# Patient Record
Sex: MALE | Race: WHITE | Employment: FULL TIME | URBAN - NONMETROPOLITAN AREA
[De-identification: names, ages, dates, MRNs, and addresses within clinical notes are randomized per-mention and may not be internally consistent; named-entity substitution may affect disease eponyms.]

---

## 2020-09-02 RX ORDER — ERGOCALCIFEROL 1.25 MG/1
CAPSULE ORAL
Qty: 4 CAP | Refills: 0 | Status: SHIPPED | OUTPATIENT
Start: 2020-09-02 | End: 2020-09-25

## 2020-09-27 RX ORDER — ERGOCALCIFEROL 1.25 MG/1
CAPSULE ORAL
Qty: 12 CAP | Refills: 1 | Status: SHIPPED | OUTPATIENT
Start: 2020-09-27 | End: 2021-05-03

## 2021-03-01 ENCOUNTER — HOSPITAL ENCOUNTER (OUTPATIENT)
Dept: LAB | Age: 41
Discharge: HOME | End: 2021-03-01

## 2021-05-03 RX ORDER — ERGOCALCIFEROL 1.25 MG/1
CAPSULE ORAL
Qty: 12 CAP | Refills: 1 | Status: SHIPPED | OUTPATIENT
Start: 2021-05-03 | End: 2021-08-27 | Stop reason: SDUPTHER

## 2021-08-13 ENCOUNTER — TELEPHONE (OUTPATIENT)
Dept: FAMILY MEDICINE CLINIC | Age: 41
End: 2021-08-13

## 2021-08-13 DIAGNOSIS — Z91.89 ENCOUNTER FOR HEPATITIS C VIRUS SCREENING TEST FOR HIGH RISK PATIENT: ICD-10-CM

## 2021-08-13 DIAGNOSIS — Z13.29 THYROID DISORDER SCREEN: ICD-10-CM

## 2021-08-13 DIAGNOSIS — E55.9 VITAMIN D DEFICIENCY: ICD-10-CM

## 2021-08-13 DIAGNOSIS — E53.8 COBALAMIN DEFICIENCY: Primary | ICD-10-CM

## 2021-08-13 DIAGNOSIS — E29.1 HYPOGONADISM IN MALE: ICD-10-CM

## 2021-08-13 DIAGNOSIS — Z11.59 ENCOUNTER FOR HEPATITIS C VIRUS SCREENING TEST FOR HIGH RISK PATIENT: ICD-10-CM

## 2021-08-13 DIAGNOSIS — Z00.00 ENCOUNTER FOR ROUTINE ADULT HEALTH EXAMINATION WITHOUT ABNORMAL FINDINGS: ICD-10-CM

## 2021-08-13 NOTE — TELEPHONE ENCOUNTER
Done. He needs to make sure he gets those labs done first thing in the morning at the hospital preferably before 9am because that's when the testosterone level with be most accurate.

## 2021-08-13 NOTE — TELEPHONE ENCOUNTER
When you get a chance, could you order labs for pt to get done at the hospital?    I called to get his insurance info and he brought up wanting to get labs done before his appt on 8/27.

## 2021-08-19 ENCOUNTER — HOSPITAL ENCOUNTER (OUTPATIENT)
Dept: LAB | Age: 41
Discharge: HOME | End: 2021-08-19

## 2021-08-19 PROCEDURE — 99001 SPECIMEN HANDLING PT-LAB: CPT

## 2021-08-27 ENCOUNTER — OFFICE VISIT (OUTPATIENT)
Dept: FAMILY MEDICINE CLINIC | Age: 41
End: 2021-08-27
Payer: COMMERCIAL

## 2021-08-27 VITALS
SYSTOLIC BLOOD PRESSURE: 114 MMHG | BODY MASS INDEX: 36.05 KG/M2 | HEIGHT: 70 IN | OXYGEN SATURATION: 96 % | WEIGHT: 251.8 LBS | DIASTOLIC BLOOD PRESSURE: 68 MMHG | HEART RATE: 71 BPM

## 2021-08-27 DIAGNOSIS — Z02.89 ENCOUNTER FOR FIREFIGHTER HEALTH EXAMINATION: Primary | ICD-10-CM

## 2021-08-27 DIAGNOSIS — E66.9 OBESITY (BMI 35.0-39.9 WITHOUT COMORBIDITY): ICD-10-CM

## 2021-08-27 DIAGNOSIS — Z00.00 WELLNESS EXAMINATION: ICD-10-CM

## 2021-08-27 PROCEDURE — 99214 OFFICE O/P EST MOD 30 MIN: CPT | Performed by: NURSE PRACTITIONER

## 2021-08-27 PROCEDURE — 99396 PREV VISIT EST AGE 40-64: CPT | Performed by: NURSE PRACTITIONER

## 2021-08-27 RX ORDER — PHENTERMINE HYDROCHLORIDE 37.5 MG/1
37.5 TABLET ORAL
Qty: 30 TABLET | Refills: 2 | Status: SHIPPED | OUTPATIENT
Start: 2021-08-27 | End: 2021-11-22

## 2021-08-27 RX ORDER — ERGOCALCIFEROL 1.25 MG/1
CAPSULE ORAL
Qty: 12 CAPSULE | Refills: 1 | Status: SHIPPED | OUTPATIENT
Start: 2021-08-27 | End: 2022-01-25

## 2021-08-27 NOTE — PROGRESS NOTES
Lester Bravo is a 36 y.o.male presents with   Chief Complaint   Patient presents with    Physical       80-year-old male presents today in office for annual adult preventive exam.  He has medical history significant for hypogonadism currently followed by urology receives testosterone replacement therapy via injections. He is a  and has presented today with documentation for the \" cancer awareness and prevention program \"from Dr. Charlie Herrmann and request to had the annual labs and screening test conducted today as well. Patient complains of increasing weight gain despite working to improve dietary and lifestyle modifications. He denies ever trying any types of medical management for this. Subjective:           Past Medical History:   Diagnosis Date    Genital herpes simplex     HLD (hyperlipidemia)     Otitis externa     Vitamin D deficiency      Past Surgical History:   Procedure Laterality Date    HX APPENDECTOMY  09/05/2014     Social History     Socioeconomic History    Marital status:      Spouse name: Not on file    Number of children: Not on file    Years of education: Not on file    Highest education level: Not on file   Tobacco Use    Smoking status: Never Smoker    Smokeless tobacco: Never Used   Substance and Sexual Activity    Alcohol use: Not Currently    Drug use: Never     Social Determinants of Health     Financial Resource Strain:     Difficulty of Paying Living Expenses:    Food Insecurity:     Worried About Running Out of Food in the Last Year:     Ran Out of Food in the Last Year:    Transportation Needs:     Lack of Transportation (Medical):      Lack of Transportation (Non-Medical):    Physical Activity:     Days of Exercise per Week:     Minutes of Exercise per Session:    Stress:     Feeling of Stress :    Social Connections:     Frequency of Communication with Friends and Family:     Frequency of Social Gatherings with Friends and Family:     Attends Alevism Services:     Active Member of Clubs or Organizations:     Attends Club or Organization Meetings:     Marital Status:      Current Outpatient Medications   Medication Sig Dispense Refill    phentermine (ADIPEX-P) 37.5 mg tablet Take 1 Tablet by mouth every morning. Max Daily Amount: 37.5 mg. 30 Tablet 2    ergocalciferol (ERGOCALCIFEROL) 1,250 mcg (50,000 unit) capsule TAKE ONE (1) CAPSULE BY MOUTH ONCE A WEEK 12 Capsule 1    testosterone cypionate (DEPOTESTOTERONE CYPIONATE) 200 mg/mL injection Inject 0.5ml SQ weekly 6 mL 1    Needle, Disp, 18 G 18 gauge x 1\" ndle Use this needle to draw up 0.5ml Testosterone Cypionate 30 Pen Needle 1    Needle, Disp, 25 G 25 gauge x 5/8\" ndle Use this needle to inject 0.5ml SQ weekly 30 Each 1    Syringe, Disposable, 1 mL syrg Use as directed 30 Syringe 1    Syringe, Disposable, 1 mL syrg Use as directed 30 Syringe 1     No Known Allergies  The patient has a family history of    REVIEW OF SYSTEMS  Review of Systems   Constitutional: Negative for chills and fever. HENT: Negative for ear discharge, ear pain, hearing loss, sinus pain and sore throat. Eyes: Negative for pain. Respiratory: Negative for cough and shortness of breath. Cardiovascular: Negative for chest pain, palpitations and leg swelling. Gastrointestinal: Negative for abdominal pain, nausea and vomiting. Genitourinary: Negative for dysuria, frequency and urgency. Musculoskeletal: Negative for falls, myalgias and neck pain. Skin: Negative for rash. Neurological: Negative for dizziness, tingling, tremors and headaches. Psychiatric/Behavioral: Negative for depression, substance abuse and suicidal ideas. The patient is not nervous/anxious and does not have insomnia.         Objective:     Visit Vitals  /68 (BP 1 Location: Left upper arm, BP Patient Position: Sitting, BP Cuff Size: Adult)   Pulse 71   Ht 5' 10\" (1.778 m)   Wt 251 lb 12.8 oz (114.2 kg)   SpO2 96%   BMI 36.13 kg/m²       Current Outpatient Medications   Medication Instructions    ergocalciferol (ERGOCALCIFEROL) 1,250 mcg (50,000 unit) capsule TAKE ONE (1) CAPSULE BY MOUTH ONCE A WEEK     Needle, Disp, 18 G 18 gauge x 1\" ndle Use this needle to draw up 0.5ml Testosterone Cypionate    Needle, Disp, 25 G 25 gauge x 5/8\" ndle Use this needle to inject 0.5ml SQ weekly    phentermine (ADIPEX-P) 37.5 mg, Oral, 7AM    Syringe, Disposable, 1 mL syrg Use as directed    Syringe, Disposable, 1 mL syrg Use as directed    testosterone cypionate (DEPOTESTOTERONE CYPIONATE) 200 mg/mL injection Inject 0.5ml SQ weekly        PHYSICAL EXAM  Physical Exam  Constitutional:       Appearance: He is obese. HENT:      Head: Normocephalic and atraumatic. Right Ear: External ear normal.      Left Ear: External ear normal.   Eyes:      General: No scleral icterus. Conjunctiva/sclera: Conjunctivae normal.   Cardiovascular:      Rate and Rhythm: Normal rate and regular rhythm. Pulses: Normal pulses. Heart sounds: Normal heart sounds. No murmur heard. Pulmonary:      Effort: Pulmonary effort is normal. No respiratory distress. Breath sounds: Normal breath sounds. Musculoskeletal:         General: No swelling or tenderness. Cervical back: Normal range of motion. Lymphadenopathy:      Cervical: No cervical adenopathy. Skin:     General: Skin is warm and dry. Findings: No erythema. Neurological:      Mental Status: He is alert and oriented to person, place, and time. Psychiatric:         Mood and Affect: Mood normal.         Behavior: Behavior normal.         Thought Content: Thought content normal.         Judgment: Judgment normal.         Assessment/Plan:     Diagnoses and all orders for this visit:    1.  Encounter for  health examination  -     HEPATITIS PANEL, ACUTE  -     PSA SCREENING (SCREENING)  -     REFERRAL TO GASTROENTEROLOGY  -     EXERCISE CARDIAC STRESS TEST; Future  -     EKG, 12 LEAD, INITIAL; Future  -     ECHO ADULT COMPLETE; Future  -     THYROID STIMULATING IMMUNOGLOBULIN  -     URINALYSIS W/MICROSCOPIC  -     THYROID PEROXIDASE (TPO) AB  -     T3, FREE  -     T4, FREE  I have placed the orders as recommended per the comprehensive  cancer awareness and prevention program     2. Obesity (BMI 35.0-39.9 without comorbidity)  -     phentermine (ADIPEX-P) 37.5 mg tablet; Take 1 Tablet by mouth every morning. Max Daily Amount: 37.5 mg. Patient's BMI and negative contraindications I am starting him on phentermine I did relay the possible side effects from this medication instructed him to be certain he stays well-hydrated with water while taking the medication he is to continue to work to improve dietary lifestyle modifications and will follow up with me in 3 months or sooner as needed. 3. Wellness examination        Follow-up and Dispositions    · Return in about 3 months (around 11/27/2021). Disclaimer:    I have discussed the diagnosis with the patient and the intended plan as seen above. The patient understands our medical plan. The risks, benefits and significant side effects of all medications have been reviewed. Anticipated time course and progression of condition reviewed. All questions have been addressed. He received an after visit summary, with information reviewed, and questions answered. Where appropriate, he is instructed to call the clinic if he has not been notified either by phone or through [address] with the results of his tests or with an appointment plan for any referrals within 1 week(s). The patient  is to call if his condition worsens or fails to improve or if significant side effects are experienced.        Claudia Richardson NP

## 2021-08-27 NOTE — PROGRESS NOTES
Regulo Beckham presents today for   Chief Complaint   Patient presents with    Physical       Is someone accompanying this pt? no    Is the patient using any DME equipment during OV? no    Depression Screening:  3 most recent PHQ Screens 8/27/2021   Little interest or pleasure in doing things Not at all   Feeling down, depressed, irritable, or hopeless Not at all   Total Score PHQ 2 0       Learning Assessment:  Learning Assessment 8/27/2021   PRIMARY LEARNER Patient   HIGHEST LEVEL OF EDUCATION - PRIMARY LEARNER  SOME COLLEGE   BARRIERS PRIMARY LEARNER NONE   CO-LEARNER CAREGIVER No   PRIMARY LANGUAGE ENGLISH   LEARNER PREFERENCE PRIMARY LISTENING   ANSWERED BY pateint   RELATIONSHIP SELF         Health Maintenance reviewed and discussed and ordered per Provider. Health Maintenance Due   Topic Date Due    Hepatitis C Screening  Never done    COVID-19 Vaccine (1) Never done    DTaP/Tdap/Td series (1 - Tdap) Never done    Lipid Screen  Never done   . Coordination of Care:  1. Have you been to the ER, urgent care clinic since your last visit? Hospitalized since your last visit? no    2. Have you seen or consulted any other health care providers outside of the [address] [place] [address] since your last visit? Include any pap smears or colon screening.  no

## 2021-08-28 LAB
25(OH)D3+25(OH)D2 SERPL-MCNC: 40 NG/ML (ref 30–100)
ALBUMIN SERPL-MCNC: 4.7 G/DL (ref 4–5)
ALBUMIN/GLOB SERPL: 1.8 {RATIO} (ref 1.2–2.2)
ALP SERPL-CCNC: 58 IU/L (ref 48–121)
ALT SERPL-CCNC: 25 IU/L (ref 0–44)
AST SERPL-CCNC: 18 IU/L (ref 0–40)
BILIRUB SERPL-MCNC: 0.5 MG/DL (ref 0–1.2)
BUN SERPL-MCNC: 15 MG/DL (ref 6–24)
BUN/CREAT SERPL: 14 (ref 9–20)
CALCIUM SERPL-MCNC: 9.4 MG/DL (ref 8.7–10.2)
CHLORIDE SERPL-SCNC: 103 MMOL/L (ref 96–106)
CHOLEST SERPL-MCNC: 221 MG/DL (ref 100–199)
CO2 SERPL-SCNC: 20 MMOL/L (ref 20–29)
CREAT SERPL-MCNC: 1.09 MG/DL (ref 0.76–1.27)
ERYTHROCYTE [DISTWIDTH] IN BLOOD BY AUTOMATED COUNT: 13.4 % (ref 11.6–15.4)
GLOBULIN SER CALC-MCNC: 2.6 G/DL (ref 1.5–4.5)
GLUCOSE SERPL-MCNC: 95 MG/DL (ref 65–99)
HCT VFR BLD AUTO: 47.5 % (ref 37.5–51)
HCV AB S/CO SERPL IA: <0.1 S/CO RATIO (ref 0–0.9)
HDLC SERPL-MCNC: 27 MG/DL
HGB BLD-MCNC: 16.2 G/DL (ref 13–17.7)
LDLC SERPL CALC-MCNC: 132 MG/DL (ref 0–99)
MCH RBC QN AUTO: 29.9 PG (ref 26.6–33)
MCHC RBC AUTO-ENTMCNC: 34.1 G/DL (ref 31.5–35.7)
MCV RBC AUTO: 88 FL (ref 79–97)
PLATELET # BLD AUTO: 288 X10E3/UL (ref 150–450)
POTASSIUM SERPL-SCNC: 4.8 MMOL/L (ref 3.5–5.2)
PROT SERPL-MCNC: 7.3 G/DL (ref 6–8.5)
RBC # BLD AUTO: 5.42 X10E6/UL (ref 4.14–5.8)
SODIUM SERPL-SCNC: 141 MMOL/L (ref 134–144)
SPECIMEN STATUS REPORT, ROLRST: NORMAL
TESTOST FREE SERPL-MCNC: 20.5 PG/ML (ref 6.8–21.5)
TESTOST SERPL-MCNC: 617.3 NG/DL (ref 264–916)
TRIGL SERPL-MCNC: 341 MG/DL (ref 0–149)
TSH SERPL DL<=0.005 MIU/L-ACNC: 1.63 UIU/ML (ref 0.45–4.5)
VIT B12 SERPL-MCNC: 375 PG/ML (ref 232–1245)
VLDLC SERPL CALC-MCNC: 62 MG/DL (ref 5–40)
WBC # BLD AUTO: 7.6 X10E3/UL (ref 3.4–10.8)

## 2021-11-22 DIAGNOSIS — E66.9 OBESITY (BMI 35.0-39.9 WITHOUT COMORBIDITY): ICD-10-CM

## 2021-11-22 RX ORDER — PHENTERMINE HYDROCHLORIDE 37.5 MG/1
TABLET ORAL
Qty: 30 TABLET | Refills: 2 | Status: SHIPPED | OUTPATIENT
Start: 2021-11-22 | End: 2021-12-13 | Stop reason: SDUPTHER

## 2021-12-13 ENCOUNTER — OFFICE VISIT (OUTPATIENT)
Dept: FAMILY MEDICINE CLINIC | Age: 41
End: 2021-12-13
Payer: COMMERCIAL

## 2021-12-13 VITALS
OXYGEN SATURATION: 99 % | RESPIRATION RATE: 18 BRPM | HEART RATE: 73 BPM | SYSTOLIC BLOOD PRESSURE: 122 MMHG | HEIGHT: 70 IN | WEIGHT: 233.4 LBS | BODY MASS INDEX: 33.41 KG/M2 | DIASTOLIC BLOOD PRESSURE: 86 MMHG

## 2021-12-13 DIAGNOSIS — E66.9 OBESITY (BMI 35.0-39.9 WITHOUT COMORBIDITY): ICD-10-CM

## 2021-12-13 PROCEDURE — 99213 OFFICE O/P EST LOW 20 MIN: CPT | Performed by: NURSE PRACTITIONER

## 2021-12-13 RX ORDER — PHENTERMINE HYDROCHLORIDE 37.5 MG/1
TABLET ORAL
Qty: 30 TABLET | Refills: 2 | Status: SHIPPED | OUTPATIENT
Start: 2021-12-13 | End: 2022-06-27 | Stop reason: SDUPTHER

## 2021-12-13 NOTE — PROGRESS NOTES
Nadia Pereyra presents today for   Chief Complaint   Patient presents with    Follow-up       Is someone accompanying this pt? No    Is the patient using any DME equipment during OV? No    Depression Screening:  3 most recent PHQ Screens 12/13/2021   Little interest or pleasure in doing things Not at all   Feeling down, depressed, irritable, or hopeless Not at all   Total Score PHQ 2 0       Learning Assessment:  Learning Assessment 8/27/2021   PRIMARY LEARNER Patient   HIGHEST LEVEL OF EDUCATION - PRIMARY LEARNER  SOME COLLEGE   BARRIERS PRIMARY LEARNER NONE   CO-LEARNER CAREGIVER No   PRIMARY LANGUAGE ENGLISH   LEARNER PREFERENCE PRIMARY LISTENING   ANSWERED BY pateint   RELATIONSHIP SELF       Health Maintenance reviewed and discussed and ordered per Provider. Health Maintenance Due   Topic Date Due    DTaP/Tdap/Td series (1 - Tdap) Never done    Flu Vaccine (1) Never done   . Coordination of Care:  1. Have you been to the ER, urgent care clinic since your last visit? Hospitalized since your last visit? No    2. Have you seen or consulted any other health care providers outside of the [address] [place] [address] since your last visit? Include any pap smears or colon screening.  No

## 2021-12-14 NOTE — PROGRESS NOTES
Artemio Lock is a 39 y.o.male presents with   Chief Complaint   Patient presents with    Follow-up     40-year-old male presents today office for 3-month follow-up related to starting phentermine for weight loss. August 2021 patient was 251 pounds he is now down to 233 pounds and states he feels really well. He states it is now deer season and with hunting he is doing a lot of walking and physical activity. He states he continues to work to improve dietary and lifestyle modifications. Subjective:           Past Medical History:   Diagnosis Date    Genital herpes simplex     HLD (hyperlipidemia)     Otitis externa     Vitamin D deficiency      Past Surgical History:   Procedure Laterality Date    HX APPENDECTOMY  09/05/2014     Social History     Socioeconomic History    Marital status:    Tobacco Use    Smoking status: Never Smoker    Smokeless tobacco: Never Used   Substance and Sexual Activity    Alcohol use: Not Currently    Drug use: Never     Current Outpatient Medications   Medication Sig Dispense Refill    phentermine (ADIPEX-P) 37.5 mg tablet TAKE 1 TABLET BY MOUTH EVERY MORNING *MAX DAILY AMOUNT 37. 5MG* 30 Tablet 2    testosterone cypionate (DEPOTESTOTERONE CYPIONATE) 200 mg/mL injection INJECT INTRAMUSCULARLY 0.5 ML WEEKLY 6 mL 1    ergocalciferol (ERGOCALCIFEROL) 1,250 mcg (50,000 unit) capsule TAKE ONE (1) CAPSULE BY MOUTH ONCE A WEEK 12 Capsule 1    Needle, Disp, 18 G 18 gauge x 1\" ndle Use this needle to draw up 0.5ml Testosterone Cypionate 30 Pen Needle 1    Needle, Disp, 25 G 25 gauge x 5/8\" ndle Use this needle to inject 0.5ml SQ weekly 30 Each 1    Syringe, Disposable, 1 mL syrg Use as directed 30 Syringe 1    Syringe, Disposable, 1 mL syrg Use as directed 30 Syringe 1     No Known Allergies  The patient has a family history of    REVIEW OF SYSTEMS  Review of Systems   Cardiovascular: Negative for chest pain and palpitations.        Objective:     Visit Vitals  /86 (BP 1 Location: Right upper arm, BP Patient Position: Sitting, BP Cuff Size: Large adult)   Pulse 73   Resp 18   Ht 5' 10\" (1.778 m)   Wt 233 lb 6.4 oz (105.9 kg)   SpO2 99%   BMI 33.49 kg/m²       Current Outpatient Medications   Medication Instructions    ergocalciferol (ERGOCALCIFEROL) 1,250 mcg (50,000 unit) capsule TAKE ONE (1) CAPSULE BY MOUTH ONCE A WEEK     Needle, Disp, 18 G 18 gauge x 1\" ndle Use this needle to draw up 0.5ml Testosterone Cypionate    Needle, Disp, 25 G 25 gauge x 5/8\" ndle Use this needle to inject 0.5ml SQ weekly    phentermine (ADIPEX-P) 37.5 mg tablet TAKE 1 TABLET BY MOUTH EVERY MORNING *MAX DAILY AMOUNT 37. 5MG*    Syringe, Disposable, 1 mL syrg Use as directed    Syringe, Disposable, 1 mL syrg Use as directed    testosterone cypionate (DEPOTESTOTERONE CYPIONATE) 200 mg/mL injection INJECT INTRAMUSCULARLY 0.5 ML WEEKLY        PHYSICAL EXAM  Physical Exam  Constitutional:       Appearance: Normal appearance. Cardiovascular:      Heart sounds: Normal heart sounds. Pulmonary:      Breath sounds: Normal breath sounds. Skin:     General: Skin is warm and dry. Neurological:      Mental Status: He is alert and oriented to person, place, and time. Psychiatric:         Behavior: Behavior normal.         Assessment/Plan:     Diagnoses and all orders for this visit:    1. Obesity (BMI 35.0-39.9 without comorbidity)  -     phentermine (ADIPEX-P) 37.5 mg tablet; TAKE 1 TABLET BY MOUTH EVERY MORNING *MAX DAILY AMOUNT 37. 5MG*      constipation    Follow-up and Dispositions    · Return in about 6 months (around 6/13/2022). Disclaimer:    I have discussed the diagnosis with the patient and the intended plan as seen above. The patient understands our medical plan. The risks, benefits and significant side effects of all medications have been reviewed. Anticipated time course and progression of condition reviewed. All questions have been addressed.   He received an after visit summary, with information reviewed, and questions answered. Where appropriate, he is instructed to call the clinic if he has not been notified either by phone or through [address] with the results of his tests or with an appointment plan for any referrals within 1 week(s). The patient  is to call if his condition worsens or fails to improve or if significant side effects are experienced.        Luis Downing NP

## 2022-01-25 RX ORDER — ERGOCALCIFEROL 1.25 MG/1
CAPSULE ORAL
Qty: 12 CAPSULE | Refills: 1 | Status: SHIPPED | OUTPATIENT
Start: 2022-01-25 | End: 2022-09-26

## 2022-02-25 ENCOUNTER — HOSPITAL ENCOUNTER (OUTPATIENT)
Dept: LAB | Age: 42
Discharge: HOME | End: 2022-02-25

## 2022-02-25 PROCEDURE — 99001 SPECIMEN HANDLING PT-LAB: CPT

## 2022-05-27 ENCOUNTER — HOSPITAL ENCOUNTER (OUTPATIENT)
Dept: LAB | Age: 42
Discharge: HOME | End: 2022-05-27

## 2022-05-27 PROCEDURE — 99001 SPECIMEN HANDLING PT-LAB: CPT

## 2022-06-27 ENCOUNTER — OFFICE VISIT (OUTPATIENT)
Dept: FAMILY MEDICINE CLINIC | Age: 42
End: 2022-06-27
Payer: COMMERCIAL

## 2022-06-27 VITALS
HEART RATE: 74 BPM | BODY MASS INDEX: 35.59 KG/M2 | DIASTOLIC BLOOD PRESSURE: 86 MMHG | SYSTOLIC BLOOD PRESSURE: 130 MMHG | RESPIRATION RATE: 19 BRPM | HEIGHT: 70 IN | OXYGEN SATURATION: 97 % | TEMPERATURE: 97.9 F | WEIGHT: 248.6 LBS

## 2022-06-27 DIAGNOSIS — R79.89 LOW TESTOSTERONE: ICD-10-CM

## 2022-06-27 DIAGNOSIS — E66.09 CLASS 2 OBESITY DUE TO EXCESS CALORIES WITHOUT SERIOUS COMORBIDITY WITH BODY MASS INDEX (BMI) OF 35.0 TO 35.9 IN ADULT: ICD-10-CM

## 2022-06-27 DIAGNOSIS — Z00.00 WELLNESS EXAMINATION: Primary | ICD-10-CM

## 2022-06-27 DIAGNOSIS — E66.9 OBESITY (BMI 35.0-39.9 WITHOUT COMORBIDITY): ICD-10-CM

## 2022-06-27 PROCEDURE — 99213 OFFICE O/P EST LOW 20 MIN: CPT | Performed by: NURSE PRACTITIONER

## 2022-06-27 RX ORDER — PHENTERMINE HYDROCHLORIDE 37.5 MG/1
TABLET ORAL
Qty: 30 TABLET | Refills: 2 | Status: SHIPPED | OUTPATIENT
Start: 2022-06-27

## 2022-06-27 NOTE — PROGRESS NOTES
Tom Bonilla is a 39 y.o.male presents with   Chief Complaint   Patient presents with    Follow-up       {[id number]}        Subjective:           Past Medical History:   Diagnosis Date    Genital herpes simplex     HLD (hyperlipidemia)     Otitis externa     Vitamin D deficiency      Past Surgical History:   Procedure Laterality Date    HX APPENDECTOMY  09/05/2014     Social History     Socioeconomic History    Marital status:    Tobacco Use    Smoking status: Never Smoker    Smokeless tobacco: Never Used   Substance and Sexual Activity    Alcohol use: Not Currently     Alcohol/week: 0.0 standard drinks    Drug use: Never     Current Outpatient Medications   Medication Sig Dispense Refill    testosterone cypionate (DEPOTESTOTERONE CYPIONATE) 200 mg/mL injection INJECT 0.5ML  INTRAMUSCULARLY ONCE A WEEK 6 mL 1    tadalafiL (CIALIS) 5 mg tablet Take 1 Tablet by mouth daily. 90 Tablet 3    ergocalciferol (ERGOCALCIFEROL) 1,250 mcg (50,000 unit) capsule TAKE ONE (1) CAPSULE BY MOUTH ONCE A WEEK 12 Capsule 1    Needle, Disp, 18 G 18 gauge x 1\" ndle Use this needle to draw up 0.5ml Testosterone Cypionate 30 Pen Needle 1    Needle, Disp, 25 G 25 gauge x 5/8\" ndle Use this needle to inject 0.5ml SQ weekly 30 Each 1    Syringe, Disposable, 1 mL syrg Use as directed 30 Syringe 1    phentermine (ADIPEX-P) 37.5 mg tablet TAKE 1 TABLET BY MOUTH EVERY MORNING *MAX DAILY AMOUNT 37. 5MG* (Patient not taking: Reported on 6/27/2022) 30 Tablet 2     No Known Allergies  The patient has a family history of    REVIEW OF SYSTEMS  ROS    Objective:     Visit Vitals  /86 (BP 1 Location: Right upper arm, BP Patient Position: Sitting, BP Cuff Size: Large adult)   Pulse 74   Temp 97.9 °F (36.6 °C) (Temporal)   Resp 19   Ht 5' 10\" (1.778 m)   Wt 248 lb 9.6 oz (112.8 kg)   SpO2 97%   BMI 35.67 kg/m²       Current Outpatient Medications   Medication Instructions    ergocalciferol (ERGOCALCIFEROL) 1,250 mcg (50,000 unit) capsule TAKE ONE (1) CAPSULE BY MOUTH ONCE A WEEK    Needle, Disp, 18 G 18 gauge x 1\" ndle Use this needle to draw up 0.5ml Testosterone Cypionate    Needle, Disp, 25 G 25 gauge x 5/8\" ndle Use this needle to inject 0.5ml SQ weekly    phentermine (ADIPEX-P) 37.5 mg tablet TAKE 1 TABLET BY MOUTH EVERY MORNING *MAX DAILY AMOUNT 37. 5MG*    Syringe, Disposable, 1 mL syrg Use as directed    tadalafiL (CIALIS) 5 mg, Oral, DAILY    testosterone cypionate (DEPOTESTOTERONE CYPIONATE) 200 mg/mL injection INJECT 0.5ML  INTRAMUSCULARLY ONCE A WEEK        PHYSICAL EXAM  Physical Exam    Assessment/Plan:     Diagnoses and all orders for this visit:    1. Wellness examination  -     CBC W/O DIFF; Future  -     METABOLIC PANEL, COMPREHENSIVE; Future  -     LIPID PANEL; Future  -     VITAMIN D, 25 HYDROXY; Future  -     VITAMIN B12; Future  -     TSH 3RD GENERATION; Future  -     HEMOGLOBIN A1C W/O EAG; Future    2. Class 2 obesity due to excess calories without serious comorbidity with body mass index (BMI) of 35.0 to 35.9 in adult    3. Low testosterone        Follow-up and Dispositions    · Return in about 3 months (around 9/27/2022) for weight loss, wellness. Disclaimer:    I have discussed the diagnosis with the patient and the intended plan as seen above. The patient understands our medical plan. The risks, benefits and significant side effects of all medications have been reviewed. Anticipated time course and progression of condition reviewed. All questions have been addressed. He received an after visit summary, with information reviewed, and questions answered. Where appropriate, he is instructed to call the clinic if he has not been notified either by phone or through [address] with the results of his tests or with an appointment plan for any referrals within 1 week(s). The patient  is to call if his condition worsens or fails to improve or if significant side effects are experienced. Sanjuan Lombard, NP

## 2022-06-27 NOTE — PROGRESS NOTES
Vic De La Rosa presents today for No chief complaint on file. Is someone accompanying this pt? No    Is the patient using any DME equipment during OV? No    Depression Screening:  3 most recent PHQ Screens 6/27/2022   Little interest or pleasure in doing things Not at all   Feeling down, depressed, irritable, or hopeless Not at all   Total Score PHQ 2 0       Learning Assessment:  Learning Assessment 8/27/2021   PRIMARY LEARNER Patient   HIGHEST LEVEL OF EDUCATION - PRIMARY LEARNER  SOME COLLEGE   BARRIERS PRIMARY LEARNER NONE   CO-LEARNER CAREGIVER No   PRIMARY LANGUAGE ENGLISH   LEARNER PREFERENCE PRIMARY LISTENING   ANSWERED BY pateint   RELATIONSHIP SELF       Fall Risk  No flowsheet data found. Health Maintenance reviewed and discussed and ordered per Provider. Health Maintenance Due   Topic Date Due    DTaP/Tdap/Td series (1 - Tdap) Never done    COVID-19 Vaccine (3 - Booster for Moderna series) 01/31/2022   . Coordination of Care:    1. Have you been to the ER, urgent care clinic since your last visit? Hospitalized since your last visit? NO    2. Have you seen or consulted any other health care providers outside of the [address] [place] [address] since your last visit? Include any pap smears or colon screening. No    3. For patients aged 39-70: Has the patient had a colonoscopy / FIT/ Cologuard? NA - based on age      If the patient is female:    4. For patients aged 41-77: Has the patient had a mammogram within the past 2 years? NA - based on age or sex      11. For patients aged 21-65: Has the patient had a pap smear?  NA - based on age or sex
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
## 2022-08-22 DIAGNOSIS — Z00.00 WELLNESS EXAMINATION: ICD-10-CM

## 2022-09-26 ENCOUNTER — HOSPITAL ENCOUNTER (OUTPATIENT)
Dept: LAB | Age: 42
Discharge: HOME | End: 2022-09-26

## 2022-09-26 PROCEDURE — 99001 SPECIMEN HANDLING PT-LAB: CPT

## 2022-09-26 RX ORDER — ERGOCALCIFEROL 1.25 MG/1
CAPSULE ORAL
Qty: 12 CAPSULE | Refills: 1 | Status: SHIPPED | OUTPATIENT
Start: 2022-09-26

## 2022-09-28 LAB — HBA1C MFR BLD HPLC: 5.7 %

## 2022-09-30 ENCOUNTER — OFFICE VISIT (OUTPATIENT)
Dept: FAMILY MEDICINE CLINIC | Age: 42
End: 2022-09-30
Payer: COMMERCIAL

## 2022-09-30 VITALS
DIASTOLIC BLOOD PRESSURE: 86 MMHG | BODY MASS INDEX: 35.59 KG/M2 | TEMPERATURE: 97.1 F | SYSTOLIC BLOOD PRESSURE: 118 MMHG | OXYGEN SATURATION: 98 % | HEART RATE: 66 BPM | HEIGHT: 70 IN | WEIGHT: 248.6 LBS | RESPIRATION RATE: 19 BRPM

## 2022-09-30 DIAGNOSIS — E66.09 CLASS 2 OBESITY DUE TO EXCESS CALORIES WITHOUT SERIOUS COMORBIDITY WITH BODY MASS INDEX (BMI) OF 35.0 TO 35.9 IN ADULT: Primary | ICD-10-CM

## 2022-09-30 DIAGNOSIS — R79.89 LOW TESTOSTERONE: ICD-10-CM

## 2022-09-30 DIAGNOSIS — E78.2 MIXED HYPERLIPIDEMIA: ICD-10-CM

## 2022-09-30 DIAGNOSIS — R73.03 PRE-DIABETES: ICD-10-CM

## 2022-09-30 PROCEDURE — 99396 PREV VISIT EST AGE 40-64: CPT | Performed by: NURSE PRACTITIONER

## 2022-09-30 RX ORDER — METFORMIN HYDROCHLORIDE 500 MG/1
TABLET, EXTENDED RELEASE ORAL
Qty: 360 TABLET | Refills: 1 | Status: SHIPPED | OUTPATIENT
Start: 2022-09-30

## 2022-09-30 NOTE — PROGRESS NOTES
Clari Tarango presents today for   Chief Complaint   Patient presents with    Physical       Is someone accompanying this pt? No    Is the patient using any DME equipment during OV? No    Depression Screening:  3 most recent PHQ Screens 9/30/2022   Little interest or pleasure in doing things Not at all   Feeling down, depressed, irritable, or hopeless Not at all   Total Score PHQ 2 0       Learning Assessment:  Learning Assessment 8/27/2021   PRIMARY LEARNER Patient   HIGHEST LEVEL OF EDUCATION - PRIMARY LEARNER  SOME COLLEGE   BARRIERS PRIMARY LEARNER NONE   CO-LEARNER CAREGIVER No   PRIMARY LANGUAGE ENGLISH   LEARNER PREFERENCE PRIMARY LISTENING   ANSWERED BY pateint   RELATIONSHIP SELF       Fall Risk  No flowsheet data found. Health Maintenance reviewed and discussed and ordered per Provider. Health Maintenance Due   Topic Date Due    DTaP/Tdap/Td series (1 - Tdap) Never done    COVID-19 Vaccine (3 - Booster for Moderna series) 01/31/2022    Flu Vaccine (1) Never done   . Coordination of Care:    1. Have you been to the ER, urgent care clinic since your last visit? Hospitalized since your last visit? No    2. Have you seen or consulted any other health care providers outside of the [address] [place] [address] since your last visit? Include any pap smears or colon screening. no    3. For patients aged 39-70: Has the patient had a colonoscopy / FIT/ Cologuard? NA - based on age      If the patient is female:    4. For patients aged 41-77: Has the patient had a mammogram within the past 2 years? NA - based on age or sex      11. For patients aged 21-65: Has the patient had a pap smear?  NA - based on age or sex

## 2022-09-30 NOTE — PROGRESS NOTES
Mark Carter is a 39 y.o.male presents with   Chief Complaint   Patient presents with    Physical     51-year-old male presents today in office for annual adult preventive exam.  He had all lab work conducted prior to today's visit. A1c showed 5.9% prediabetes along with mixed hyperlipidemia with triglycerides nonfasting in the 400 range. Patient was previously on phentermine states he stopped the medication due to dizziness and lightheadedness. Subjective:           Past Medical History:   Diagnosis Date    Genital herpes simplex     HLD (hyperlipidemia)     Otitis externa     Vitamin D deficiency      Past Surgical History:   Procedure Laterality Date    HX APPENDECTOMY  09/05/2014     Social History     Socioeconomic History    Marital status:    Tobacco Use    Smoking status: Never    Smokeless tobacco: Never   Substance and Sexual Activity    Alcohol use: Not Currently     Alcohol/week: 0.0 standard drinks    Drug use: Never     Current Outpatient Medications   Medication Sig Dispense Refill    metFORMIN ER (GLUCOPHAGE XR) 500 mg tablet 1 tab po bid x 7 days then increase to 2 tabs po bid thereafter 360 Tablet 1    ergocalciferol (ERGOCALCIFEROL) 1,250 mcg (50,000 unit) capsule TAKE ONE (1) CAPSULE BY MOUTH ONCE A WEEK 12 Capsule 1    Needle, Disp, 25 G (Disposable Needles) 25 gauge x 5/8\" ndle USE THIS NEEDLE TO INJECT INTRAMUSCULARLY 0.5 ML WEEKLY 30 Each 1    testosterone cypionate (DEPOTESTOTERONE CYPIONATE) 200 mg/mL injection INJECT 0.5ML  INTRAMUSCULARLY ONCE A WEEK 6 mL 1    tadalafiL (CIALIS) 5 mg tablet Take 1 Tablet by mouth daily. 90 Tablet 3    Needle, Disp, 18 G 18 gauge x 1\" ndle Use this needle to draw up 0.5ml Testosterone Cypionate 30 Pen Needle 1    Syringe, Disposable, 1 mL syrg Use as directed 30 Syringe 1    phentermine (ADIPEX-P) 37.5 mg tablet TAKE 1 TABLET BY MOUTH EVERY MORNING *MAX DAILY AMOUNT 37. 5MG* (Patient not taking: Reported on 9/30/2022) 30 Tablet 2     No Known Allergies  The patient has a family history of    REVIEW OF SYSTEMS  Review of Systems   Constitutional:  Negative for chills and fever. HENT:  Negative for ear discharge, ear pain, hearing loss, sinus pain and sore throat. Eyes:  Negative for pain. Respiratory:  Negative for cough and shortness of breath. Cardiovascular:  Negative for chest pain, palpitations and leg swelling. Gastrointestinal:  Negative for abdominal pain, nausea and vomiting. Genitourinary:  Negative for dysuria, frequency and urgency. Musculoskeletal:  Negative for falls, myalgias and neck pain. Skin:  Negative for rash. Neurological:  Positive for dizziness. Negative for tingling, tremors and headaches. Psychiatric/Behavioral:  Negative for depression, substance abuse and suicidal ideas. The patient is not nervous/anxious and does not have insomnia. Objective:     Visit Vitals  /86 (BP 1 Location: Right upper arm, BP Patient Position: Sitting, BP Cuff Size: Large adult)   Pulse 66   Temp 97.1 °F (36.2 °C) (Temporal)   Resp 19   Ht 5' 10\" (1.778 m)   Wt 248 lb 9.6 oz (112.8 kg)   SpO2 98%   BMI 35.67 kg/m²       Current Outpatient Medications   Medication Instructions    ergocalciferol (ERGOCALCIFEROL) 1,250 mcg (50,000 unit) capsule TAKE ONE (1) CAPSULE BY MOUTH ONCE A WEEK    metFORMIN ER (GLUCOPHAGE XR) 500 mg tablet 1 tab po bid x 7 days then increase to 2 tabs po bid thereafter    Needle, Disp, 18 G 18 gauge x 1\" ndle Use this needle to draw up 0.5ml Testosterone Cypionate    Needle, Disp, 25 G (Disposable Needles) 25 gauge x 5/8\" ndle USE THIS NEEDLE TO INJECT INTRAMUSCULARLY 0.5 ML WEEKLY    phentermine (ADIPEX-P) 37.5 mg tablet TAKE 1 TABLET BY MOUTH EVERY MORNING *MAX DAILY AMOUNT 37. 5MG*    Syringe, Disposable, 1 mL syrg Use as directed    tadalafiL (CIALIS) 5 mg, Oral, DAILY    testosterone cypionate (DEPOTESTOTERONE CYPIONATE) 200 mg/mL injection INJECT 0.5ML  INTRAMUSCULARLY ONCE A WEEK PHYSICAL EXAM  Physical Exam  Constitutional:       Appearance: He is obese. Cardiovascular:      Heart sounds: Normal heart sounds. Pulmonary:      Breath sounds: Normal breath sounds. Musculoskeletal:      Right lower leg: No edema. Left lower leg: No edema. Skin:     General: Skin is warm and dry. Neurological:      Mental Status: He is alert and oriented to person, place, and time. Psychiatric:         Behavior: Behavior normal.       Assessment/Plan:     Diagnoses and all orders for this visit:    1. Class 2 obesity due to excess calories without serious comorbidity with body mass index (BMI) of 35.0 to 35.9 in adult  -     REFERRAL TO WEIGHT LOSS    2. Low testosterone    3. Pre-diabetes  Starting metformin for prediabetes  4. Mixed hyperlipidemia    Other orders  -     metFORMIN ER (GLUCOPHAGE XR) 500 mg tablet; 1 tab po bid x 7 days then increase to 2 tabs po bid thereafter        Follow-up and Dispositions    Return in about 6 months (around 3/30/2023). Disclaimer:    I have discussed the diagnosis with the patient and the intended plan as seen above. The patient understands our medical plan. The risks, benefits and significant side effects of all medications have been reviewed. Anticipated time course and progression of condition reviewed. All questions have been addressed. He received an after visit summary, with information reviewed, and questions answered. Where appropriate, he is instructed to call the clinic if he has not been notified either by phone or through [address] with the results of his tests or with an appointment plan for any referrals within 1 week(s). The patient  is to call if his condition worsens or fails to improve or if significant side effects are experienced.        Marylee Starks, NP

## 2023-01-04 ENCOUNTER — HOSPITAL ENCOUNTER (OUTPATIENT)
Dept: LAB | Age: 43
Discharge: HOME | End: 2023-01-04

## 2023-01-04 PROCEDURE — 99001 SPECIMEN HANDLING PT-LAB: CPT

## 2023-04-04 ENCOUNTER — TELEPHONE (OUTPATIENT)
Dept: FAMILY MEDICINE CLINIC | Facility: CLINIC | Age: 43
End: 2023-04-04

## 2023-04-04 DIAGNOSIS — E55.9 VITAMIN D DEFICIENCY: ICD-10-CM

## 2023-04-04 DIAGNOSIS — E78.2 MIXED HYPERLIPIDEMIA: ICD-10-CM

## 2023-04-04 DIAGNOSIS — Z00.00 ENCOUNTER FOR GENERAL ADULT MEDICAL EXAMINATION WITHOUT ABNORMAL FINDINGS: ICD-10-CM

## 2023-04-04 DIAGNOSIS — R73.03 PREDIABETES: Primary | ICD-10-CM

## 2023-04-04 DIAGNOSIS — E53.8 COBALAMIN DEFICIENCY: ICD-10-CM

## 2023-04-04 DIAGNOSIS — R79.89 LOW TESTOSTERONE: ICD-10-CM

## 2023-04-05 ENCOUNTER — HOSPITAL ENCOUNTER (OUTPATIENT)
Age: 43
Discharge: HOME | End: 2023-04-08

## 2023-04-05 DIAGNOSIS — E55.9 VITAMIN D DEFICIENCY: ICD-10-CM

## 2023-04-05 DIAGNOSIS — E53.8 COBALAMIN DEFICIENCY: ICD-10-CM

## 2023-04-05 DIAGNOSIS — R79.89 LOW TESTOSTERONE: ICD-10-CM

## 2023-04-05 DIAGNOSIS — E78.2 MIXED HYPERLIPIDEMIA: ICD-10-CM

## 2023-04-05 DIAGNOSIS — R73.03 PREDIABETES: ICD-10-CM

## 2023-04-05 DIAGNOSIS — Z00.00 ENCOUNTER FOR GENERAL ADULT MEDICAL EXAMINATION WITHOUT ABNORMAL FINDINGS: ICD-10-CM

## 2023-04-05 LAB — SENTARA SPECIMEN COLLECTION: NORMAL

## 2023-04-05 PROCEDURE — 99001 SPECIMEN HANDLING PT-LAB: CPT

## 2023-04-07 ENCOUNTER — OFFICE VISIT (OUTPATIENT)
Dept: FAMILY MEDICINE CLINIC | Facility: CLINIC | Age: 43
End: 2023-04-07
Payer: COMMERCIAL

## 2023-04-07 VITALS
BODY MASS INDEX: 34.16 KG/M2 | RESPIRATION RATE: 19 BRPM | WEIGHT: 238.6 LBS | DIASTOLIC BLOOD PRESSURE: 78 MMHG | OXYGEN SATURATION: 98 % | TEMPERATURE: 97.6 F | HEART RATE: 88 BPM | SYSTOLIC BLOOD PRESSURE: 123 MMHG | HEIGHT: 70 IN

## 2023-04-07 DIAGNOSIS — E78.2 MIXED HYPERLIPIDEMIA: ICD-10-CM

## 2023-04-07 DIAGNOSIS — R73.03 PREDIABETES: Primary | ICD-10-CM

## 2023-04-07 DIAGNOSIS — E55.9 VITAMIN D DEFICIENCY: ICD-10-CM

## 2023-04-07 DIAGNOSIS — E53.8 COBALAMIN DEFICIENCY: ICD-10-CM

## 2023-04-07 DIAGNOSIS — R79.89 LOW TESTOSTERONE: ICD-10-CM

## 2023-04-07 PROCEDURE — 99214 OFFICE O/P EST MOD 30 MIN: CPT | Performed by: NURSE PRACTITIONER

## 2023-04-07 SDOH — ECONOMIC STABILITY: FOOD INSECURITY: WITHIN THE PAST 12 MONTHS, YOU WORRIED THAT YOUR FOOD WOULD RUN OUT BEFORE YOU GOT MONEY TO BUY MORE.: SOMETIMES TRUE

## 2023-04-07 SDOH — ECONOMIC STABILITY: INCOME INSECURITY: HOW HARD IS IT FOR YOU TO PAY FOR THE VERY BASICS LIKE FOOD, HOUSING, MEDICAL CARE, AND HEATING?: NOT VERY HARD

## 2023-04-07 SDOH — ECONOMIC STABILITY: FOOD INSECURITY: WITHIN THE PAST 12 MONTHS, THE FOOD YOU BOUGHT JUST DIDN'T LAST AND YOU DIDN'T HAVE MONEY TO GET MORE.: SOMETIMES TRUE

## 2023-04-07 SDOH — ECONOMIC STABILITY: HOUSING INSECURITY
IN THE LAST 12 MONTHS, WAS THERE A TIME WHEN YOU DID NOT HAVE A STEADY PLACE TO SLEEP OR SLEPT IN A SHELTER (INCLUDING NOW)?: NO

## 2023-04-07 ASSESSMENT — PATIENT HEALTH QUESTIONNAIRE - PHQ9
SUM OF ALL RESPONSES TO PHQ QUESTIONS 1-9: 0
SUM OF ALL RESPONSES TO PHQ9 QUESTIONS 1 & 2: 0
SUM OF ALL RESPONSES TO PHQ QUESTIONS 1-9: 0
2. FEELING DOWN, DEPRESSED OR HOPELESS: 0
SUM OF ALL RESPONSES TO PHQ QUESTIONS 1-9: 0
SUM OF ALL RESPONSES TO PHQ QUESTIONS 1-9: 0
1. LITTLE INTEREST OR PLEASURE IN DOING THINGS: 0

## 2023-04-07 ASSESSMENT — ENCOUNTER SYMPTOMS
WHEEZING: 0
CHEST TIGHTNESS: 0
SHORTNESS OF BREATH: 0

## 2023-04-07 NOTE — PROGRESS NOTES
Taz Trevino presents today for   Chief Complaint   Patient presents with    Follow-up       Is someone accompanying this pt? no    Is the patient using any DME equipment during OV? no    Depression Screening:  PHQ-9 Questionaire 4/7/2023 9/30/2022 6/27/2022   Little interest or pleasure in doing things 0 0 0   Feeling down, depressed, or hopeless 0 0 0   PHQ-9 Total Score 0 0 0       Fall Risk  No flowsheet data found. Health Maintenance reviewed and discussed and ordered per Provider. Health Maintenance Due   Topic Date Due    Varicella vaccine (1 of 2 - 2-dose childhood series) Never done    HIV screen  Never done    DTaP/Tdap/Td vaccine (1 - Tdap) Never done    COVID-19 Vaccine (3 - Booster for Moderna series) 10/26/2021   . Coordination of Care:    1. \"Have you been to the ER, urgent care clinic since your last visit? Hospitalized since your last visit? \" No    2. \"Have you seen or consulted any other health care providers outside of the [address] [place] [address] since your last visit? \" No     3. For patients aged 39-70: Has the patient had a colonoscopy / FIT/ Cologuard? NA - based on age      If the patient is female:    4. For patients aged 41-77: Has the patient had a mammogram within the past 2 years? NA - based on age or sex      11. For patients aged 21-65: Has the patient had a pap smear?  NA - based on age or sex
daily (Patient not taking: Reported on 4/7/2023)       No current facility-administered medications for this visit. No Known Allergies  The patient has a family history of    Current Outpatient Medications   Medication Instructions    BD HYPODERMIC NEEDLE 18G X 1\" MISC USE THIS NEEDLE TO DRAW UP 0.5ML TESTOSTERONE CYPIONATE    ergocalciferol (ERGOCALCIFEROL) 1.25 MG (12266 UT) capsule TAKE ONE (1) CAPSULE BY MOUTH ONCE A WEEK    metFORMIN (GLUCOPHAGE-XR) 500 MG extended release tablet 1 tab po bid x 7 days then increase to 2 tabs po bid thereafter    tadalafil (CIALIS) 5 mg, DAILY    testosterone cypionate (DEPOTESTOTERONE CYPIONATE) 200 MG/ML injection INJECT 0.5ML  INTRAMUSCULARLY ONCE A WEEK         REVIEW OF SYSTEMS  Review of Systems   Respiratory:  Negative for chest tightness, shortness of breath and wheezing. Cardiovascular:  Negative for chest pain and palpitations. Neurological:  Negative for dizziness. Objective:     PHYSICAL EXAM  Physical Exam  Constitutional:       Appearance: Normal appearance. Cardiovascular:      Heart sounds: Normal heart sounds. Pulmonary:      Breath sounds: Normal breath sounds. Musculoskeletal:      Right lower leg: No edema. Left lower leg: No edema. Skin:     General: Skin is warm and dry. Neurological:      Mental Status: He is alert and oriented to person, place, and time. Psychiatric:         Behavior: Behavior normal.       Assessment/Plan:     1. Prediabetes  2. Mixed hyperlipidemia  3. Vitamin D deficiency  4. Cobalamin deficiency  5. Low testosterone      -Labs today any changes to current treatment contingent upon those findings      AGATHA Dougherty - CNP                          Disclaimer:    I have discussed the diagnosis with the patient and the intended plan as seen above. The patient understands our medical plan. The risks, benefits and significant side effects of all medications have been reviewed.  Anticipated time course
testosterone  Keep all follow-ups with urology        AGATHA Adhikari CNP                          Disclaimer:    I have discussed the diagnosis with the patient and the intended plan as seen above. The patient understands our medical plan. The risks, benefits and significant side effects of all medications have been reviewed. Anticipated time course and progression of condition reviewed. All questions have been addressed. He received an after visit summary, with information reviewed, and questions answered. Where appropriate, he is instructed to call the clinic if he has not been notified either by phone or through [address] with the results of his tests or with an appointment plan for any referrals within 1 week(s). The patient  is to call if his condition worsens or fails to improve or if significant side effects are experienced.        AGATHA Adhikari - CNP

## 2023-04-08 LAB
A/G RATIO: 1.8 RATIO (ref 1.1–2.6)
ALBUMIN SERPL-MCNC: 4.7 G/DL (ref 3.5–5)
ALP BLD-CCNC: 61 U/L (ref 25–115)
ALT SERPL-CCNC: 20 U/L (ref 5–40)
ANION GAP SERPL CALCULATED.3IONS-SCNC: 13 MMOL/L (ref 3–15)
AST SERPL-CCNC: 13 U/L (ref 10–37)
AVERAGE GLUCOSE: 115 MG/DL (ref 91–123)
BASOPHILS # BLD: 1 % (ref 0–2)
BASOPHILS ABSOLUTE: 0.1 K/UL (ref 0–0.2)
BILIRUB SERPL-MCNC: 0.3 MG/DL (ref 0.2–1.2)
BUN BLDV-MCNC: 18 MG/DL (ref 6–22)
CALCIUM SERPL-MCNC: 9.8 MG/DL (ref 8.4–10.5)
CHLORIDE BLD-SCNC: 106 MMOL/L (ref 98–110)
CHOLESTEROL/HDL RATIO: 7.5 (ref 0–5)
CHOLESTEROL: 218 MG/DL (ref 110–200)
CO2: 22 MMOL/L (ref 20–32)
CREAT SERPL-MCNC: 1 MG/DL (ref 0.5–1.2)
CREATININE URINE: 106 MG/DL
EOSINOPHIL # BLD: 8 % (ref 0–6)
EOSINOPHILS ABSOLUTE: 0.6 K/UL (ref 0–0.5)
GLOBULIN: 2.6 G/DL (ref 2–4)
GLOMERULAR FILTRATION RATE: >60 ML/MIN/1.73 SQ.M.
GLUCOSE: 101 MG/DL (ref 70–99)
HBA1C MFR BLD: 5.6 % (ref 4.8–5.6)
HCT VFR BLD CALC: 45.5 % (ref 36.6–51.9)
HDLC SERPL-MCNC: 29 MG/DL
HEMOGLOBIN: 15.1 G/DL (ref 13.2–17.3)
LDL CHOLESTEROL CALCULATED: 145 MG/DL (ref 50–99)
LDL/HDL RATIO: 5
LYMPHOCYTES # BLD: 35 % (ref 20–45)
LYMPHOCYTES ABSOLUTE: 2.6 K/UL (ref 1–4.8)
MCH RBC QN AUTO: 29 PG (ref 26–34)
MCHC RBC AUTO-ENTMCNC: 33 G/DL (ref 31–36)
MCV RBC AUTO: 88 FL (ref 80–95)
MICROALB/CREAT RATIO (UG/MG CREAT.): NORMAL
MICROALBUMIN/CREAT 24H UR: <12 MG/L (ref 0.1–17)
MONOCYTES ABSOLUTE: 0.6 K/UL (ref 0.1–1)
MONOCYTES: 8 % (ref 3–12)
NEUTROPHILS ABSOLUTE: 3.4 K/UL (ref 1.8–7.7)
NEUTROPHILS: 47 % (ref 40–75)
NON-HDL CHOLESTEROL: 189 MG/DL
PDW BLD-RTO: 12.8 % (ref 10–15.5)
PLATELET # BLD: 305 K/UL (ref 140–440)
PMV BLD AUTO: 9.9 FL (ref 9–13)
POTASSIUM SERPL-SCNC: 4.6 MMOL/L (ref 3.5–5.5)
RBC: 5.19 M/UL (ref 3.8–5.8)
SODIUM BLD-SCNC: 141 MMOL/L (ref 133–145)
TESTOSTERONE FREE PERCENT: 2.31 % (ref 1.5–4.2)
TESTOSTERONE, FREE DIRECT: 15.75 NG/DL (ref 5–21)
TESTOSTERONE: 682 NG/DL (ref 264–916)
TOTAL PROTEIN: 7.3 G/DL (ref 6.4–8.3)
TRIGL SERPL-MCNC: 219 MG/DL (ref 40–149)
TSH SERPL DL<=0.05 MIU/L-ACNC: 1.49 MCU/ML (ref 0.27–4.2)
VITAMIN B-12: 318 PG/ML (ref 211–911)
VITAMIN D 25-HYDROXY: 48.1 NG/ML (ref 32–100)
VLDLC SERPL CALC-MCNC: 44 MG/DL (ref 8–30)
WBC: 7.3 K/UL (ref 4–11)

## 2023-04-13 ASSESSMENT — ENCOUNTER SYMPTOMS
CHEST TIGHTNESS: 0
WHEEZING: 0
SHORTNESS OF BREATH: 0

## 2023-06-30 ENCOUNTER — OFFICE VISIT (OUTPATIENT)
Dept: FAMILY MEDICINE CLINIC | Facility: CLINIC | Age: 43
End: 2023-06-30
Payer: COMMERCIAL

## 2023-06-30 VITALS
TEMPERATURE: 98.1 F | SYSTOLIC BLOOD PRESSURE: 104 MMHG | WEIGHT: 243.2 LBS | DIASTOLIC BLOOD PRESSURE: 68 MMHG | HEART RATE: 71 BPM | OXYGEN SATURATION: 95 % | RESPIRATION RATE: 18 BRPM | BODY MASS INDEX: 34.82 KG/M2 | HEIGHT: 70 IN

## 2023-06-30 DIAGNOSIS — Z78.9 VARICELLA VACCINATION STATUS UNKNOWN: ICD-10-CM

## 2023-06-30 DIAGNOSIS — R73.09 ELEVATED GLUCOSE: ICD-10-CM

## 2023-06-30 DIAGNOSIS — M72.0 DUPUYTREN CONTRACTURE: ICD-10-CM

## 2023-06-30 DIAGNOSIS — Z11.4 SCREENING FOR HIV WITHOUT PRESENCE OF RISK FACTORS: Primary | ICD-10-CM

## 2023-06-30 DIAGNOSIS — B49 FUNGAL INFECTION: ICD-10-CM

## 2023-06-30 DIAGNOSIS — Z87.39 H/O: GOUT: ICD-10-CM

## 2023-06-30 DIAGNOSIS — Z11.4 SCREENING FOR HIV WITHOUT PRESENCE OF RISK FACTORS: ICD-10-CM

## 2023-06-30 LAB — HBA1C MFR BLD: 5.3 %

## 2023-06-30 PROCEDURE — 90471 IMMUNIZATION ADMIN: CPT | Performed by: NURSE PRACTITIONER

## 2023-06-30 PROCEDURE — 99214 OFFICE O/P EST MOD 30 MIN: CPT | Performed by: NURSE PRACTITIONER

## 2023-06-30 PROCEDURE — 90715 TDAP VACCINE 7 YRS/> IM: CPT | Performed by: NURSE PRACTITIONER

## 2023-06-30 PROCEDURE — 83036 HEMOGLOBIN GLYCOSYLATED A1C: CPT | Performed by: NURSE PRACTITIONER

## 2023-06-30 RX ORDER — COLCHICINE 0.6 MG/1
0.6 TABLET ORAL DAILY
COMMUNITY
Start: 2023-06-19

## 2023-06-30 RX ORDER — MELOXICAM 15 MG/1
1 TABLET ORAL DAILY PRN
COMMUNITY
Start: 2023-06-06

## 2023-06-30 RX ORDER — KETOCONAZOLE 200 MG/1
200 TABLET ORAL DAILY
Qty: 7 TABLET | Refills: 1 | Status: SHIPPED | OUTPATIENT
Start: 2023-06-30 | End: 2023-07-07

## 2023-06-30 RX ORDER — COLCHICINE 0.6 MG/1
TABLET ORAL
Qty: 30 TABLET | Refills: 3 | Status: SHIPPED | OUTPATIENT
Start: 2023-06-30

## 2023-06-30 ASSESSMENT — PATIENT HEALTH QUESTIONNAIRE - PHQ9
2. FEELING DOWN, DEPRESSED OR HOPELESS: 0
SUM OF ALL RESPONSES TO PHQ QUESTIONS 1-9: 0
1. LITTLE INTEREST OR PLEASURE IN DOING THINGS: 0
SUM OF ALL RESPONSES TO PHQ QUESTIONS 1-9: 0
SUM OF ALL RESPONSES TO PHQ9 QUESTIONS 1 & 2: 0

## 2023-06-30 ASSESSMENT — ENCOUNTER SYMPTOMS
WHEEZING: 0
SHORTNESS OF BREATH: 0
CHEST TIGHTNESS: 0

## 2024-01-09 ENCOUNTER — HOSPITAL ENCOUNTER (OUTPATIENT)
Age: 44
Discharge: HOME | End: 2024-01-12

## 2024-01-09 LAB — SENTARA SPECIMEN COLLECTION: NORMAL

## 2024-01-11 LAB
HIV -1/0/2 AG/AB WITH REFLEX: NON REACTIVE
HIV INTERPRETATION: NORMAL
VZV IGG SER QL IA: 5.1 AI

## 2024-01-16 ENCOUNTER — TELEPHONE (OUTPATIENT)
Dept: FAMILY MEDICINE CLINIC | Facility: CLINIC | Age: 44
End: 2024-01-16

## 2024-01-16 ENCOUNTER — HOSPITAL ENCOUNTER (OUTPATIENT)
Age: 44
Discharge: HOME | End: 2024-01-19

## 2024-01-16 DIAGNOSIS — E53.8 COBALAMIN DEFICIENCY: ICD-10-CM

## 2024-01-16 DIAGNOSIS — R79.89 LOW TESTOSTERONE: ICD-10-CM

## 2024-01-16 DIAGNOSIS — E78.2 MIXED HYPERLIPIDEMIA: ICD-10-CM

## 2024-01-16 DIAGNOSIS — Z00.00 ENCOUNTER FOR GENERAL ADULT MEDICAL EXAMINATION WITHOUT ABNORMAL FINDINGS: ICD-10-CM

## 2024-01-16 DIAGNOSIS — E55.9 VITAMIN D DEFICIENCY: ICD-10-CM

## 2024-01-16 DIAGNOSIS — R73.03 PREDIABETES: Primary | ICD-10-CM

## 2024-01-16 DIAGNOSIS — R73.03 PREDIABETES: ICD-10-CM

## 2024-01-16 NOTE — TELEPHONE ENCOUNTER
Yes, labs ordered and in order for us to get the results in time, he needs to go the lab today before 6pm or tomorrow before 2pm since his send off to [facility] due to insurance.

## 2024-01-16 NOTE — TELEPHONE ENCOUNTER
Pt called and he was wondering if he's due for any labs before his appt on Thursday?  He said he was supposed to get a lot of them done before his appt.  I told him I would check with you.

## 2024-01-18 ENCOUNTER — OFFICE VISIT (OUTPATIENT)
Dept: FAMILY MEDICINE CLINIC | Facility: CLINIC | Age: 44
End: 2024-01-18

## 2024-01-18 VITALS
SYSTOLIC BLOOD PRESSURE: 130 MMHG | HEIGHT: 70 IN | TEMPERATURE: 97.9 F | BODY MASS INDEX: 37.25 KG/M2 | DIASTOLIC BLOOD PRESSURE: 73 MMHG | WEIGHT: 260.2 LBS | RESPIRATION RATE: 19 BRPM | OXYGEN SATURATION: 95 % | HEART RATE: 67 BPM

## 2024-01-18 DIAGNOSIS — E66.01 CLASS 2 SEVERE OBESITY WITH SERIOUS COMORBIDITY AND BODY MASS INDEX (BMI) OF 37.0 TO 37.9 IN ADULT, UNSPECIFIED OBESITY TYPE (HCC): ICD-10-CM

## 2024-01-18 DIAGNOSIS — R40.0 DAYTIME SOMNOLENCE: Primary | ICD-10-CM

## 2024-01-18 DIAGNOSIS — Z00.00 WELLNESS EXAMINATION: ICD-10-CM

## 2024-01-18 DIAGNOSIS — E55.9 VITAMIN D DEFICIENCY: ICD-10-CM

## 2024-01-18 DIAGNOSIS — R73.03 PREDIABETES: ICD-10-CM

## 2024-01-18 DIAGNOSIS — E78.2 MIXED HYPERLIPIDEMIA: ICD-10-CM

## 2024-01-18 DIAGNOSIS — R79.89 LOW TESTOSTERONE: ICD-10-CM

## 2024-01-18 DIAGNOSIS — E53.8 COBALAMIN DEFICIENCY: ICD-10-CM

## 2024-01-18 RX ORDER — PHENTERMINE HYDROCHLORIDE 37.5 MG/1
37.5 CAPSULE ORAL EVERY MORNING
Qty: 30 CAPSULE | Refills: 2 | Status: SHIPPED | OUTPATIENT
Start: 2024-01-18 | End: 2024-02-17

## 2024-01-18 ASSESSMENT — PATIENT HEALTH QUESTIONNAIRE - PHQ9
SUM OF ALL RESPONSES TO PHQ QUESTIONS 1-9: 0
2. FEELING DOWN, DEPRESSED OR HOPELESS: 0
SUM OF ALL RESPONSES TO PHQ QUESTIONS 1-9: 0
SUM OF ALL RESPONSES TO PHQ9 QUESTIONS 1 & 2: 0
SUM OF ALL RESPONSES TO PHQ QUESTIONS 1-9: 0
1. LITTLE INTEREST OR PLEASURE IN DOING THINGS: 0
SUM OF ALL RESPONSES TO PHQ QUESTIONS 1-9: 0

## 2024-01-18 NOTE — PROGRESS NOTES
Jonel Ventura presents today for   Chief Complaint   Patient presents with    Annual Exam    Results       Is someone accompanying this pt? no    Is the patient using any DME equipment during OV? no    Depression Screenin/30/2023     8:52 AM 2023     8:33 AM 2022    10:25 AM 2022    12:48 PM   PHQ-9 Questionaire   Little interest or pleasure in doing things 0 0 0 0   Feeling down, depressed, or hopeless 0 0 0 0   PHQ-9 Total Score 0 0 0 0       Fall Risk       No data to display                 Health Maintenance reviewed and discussed and ordered per Provider.    Health Maintenance Due   Topic Date Due    Flu vaccine (1) 2023    COVID-19 Vaccine (3 - 2023-24 season) 2023   .      Coordination of Care:    1. \"Have you been to the ER, urgent care clinic since your last visit?  Hospitalized since your last visit?\" No    2. \"Have you seen or consulted any other health care providers outside of the [facility] since your last visit?\" No     3. For patients aged 45-75: Has the patient had a colonoscopy / FIT/ Cologuard? NA - based on age      If the patient is female:    4. For patients aged 40-74: Has the patient had a mammogram within the past 2 years? NA - based on age or sex      5. For patients aged 21-65: Has the patient had a pap smear? NA - based on age or sex

## 2024-01-31 DIAGNOSIS — Z87.39 H/O: GOUT: ICD-10-CM

## 2024-02-01 RX ORDER — COLCHICINE 0.6 MG/1
TABLET ORAL
Qty: 30 TABLET | Refills: 3 | Status: SHIPPED | OUTPATIENT
Start: 2024-02-01

## 2024-02-09 RX ORDER — FENOFIBRATE 145 MG/1
145 TABLET, COATED ORAL DAILY
Qty: 90 TABLET | Refills: 3 | Status: SHIPPED | OUTPATIENT
Start: 2024-02-09

## 2024-02-09 RX ORDER — ATORVASTATIN CALCIUM 40 MG/1
40 TABLET, FILM COATED ORAL
Qty: 90 TABLET | Refills: 3 | Status: SHIPPED | OUTPATIENT
Start: 2024-02-09

## 2024-02-12 ENCOUNTER — TELEPHONE (OUTPATIENT)
Facility: CLINIC | Age: 44
End: 2024-02-12

## 2024-02-12 DIAGNOSIS — Z87.39 H/O: GOUT: ICD-10-CM

## 2024-02-12 RX ORDER — COLCHICINE 0.6 MG/1
TABLET ORAL
Qty: 30 TABLET | Refills: 3 | Status: SHIPPED | OUTPATIENT
Start: 2024-02-12

## 2024-02-12 NOTE — TELEPHONE ENCOUNTER
Pt called and stated that he went to the pharmacy and no RX is there for him. Please call [phone].

## 2024-05-16 ENCOUNTER — OFFICE VISIT (OUTPATIENT)
Age: 44
End: 2024-05-16
Payer: COMMERCIAL

## 2024-05-16 VITALS
DIASTOLIC BLOOD PRESSURE: 79 MMHG | RESPIRATION RATE: 18 BRPM | WEIGHT: 224 LBS | SYSTOLIC BLOOD PRESSURE: 122 MMHG | OXYGEN SATURATION: 97 % | HEART RATE: 64 BPM | TEMPERATURE: 97.6 F | BODY MASS INDEX: 32.07 KG/M2 | HEIGHT: 70 IN

## 2024-05-16 DIAGNOSIS — E66.3 OVERWEIGHT: ICD-10-CM

## 2024-05-16 DIAGNOSIS — G47.26 SHIFT WORK SLEEP DISORDER: ICD-10-CM

## 2024-05-16 DIAGNOSIS — E78.2 MIXED HYPERLIPIDEMIA: ICD-10-CM

## 2024-05-16 DIAGNOSIS — R06.83 SNORING: ICD-10-CM

## 2024-05-16 DIAGNOSIS — R73.03 PREDIABETES: ICD-10-CM

## 2024-05-16 DIAGNOSIS — R29.818 SUSPECTED SLEEP APNEA: Primary | ICD-10-CM

## 2024-05-16 DIAGNOSIS — R06.81 WITNESSED APNEIC SPELLS: ICD-10-CM

## 2024-05-16 DIAGNOSIS — G47.19 EXCESSIVE DAYTIME SLEEPINESS: ICD-10-CM

## 2024-05-16 PROCEDURE — 99204 OFFICE O/P NEW MOD 45 MIN: CPT | Performed by: OTOLARYNGOLOGY

## 2024-05-16 ASSESSMENT — PATIENT HEALTH QUESTIONNAIRE - PHQ9
SUM OF ALL RESPONSES TO PHQ QUESTIONS 1-9: 0
SUM OF ALL RESPONSES TO PHQ QUESTIONS 1-9: 0
2. FEELING DOWN, DEPRESSED OR HOPELESS: NOT AT ALL
SUM OF ALL RESPONSES TO PHQ9 QUESTIONS 1 & 2: 0
1. LITTLE INTEREST OR PLEASURE IN DOING THINGS: NOT AT ALL
SUM OF ALL RESPONSES TO PHQ QUESTIONS 1-9: 0
SUM OF ALL RESPONSES TO PHQ QUESTIONS 1-9: 0

## 2024-05-16 ASSESSMENT — SLEEP AND FATIGUE QUESTIONNAIRES
HOW LIKELY ARE YOU TO NOD OFF OR FALL ASLEEP WHEN YOU ARE A PASSENGER IN A CAR FOR AN HOUR WITHOUT A BREAK: WOULD NEVER DOZE
ESS TOTAL SCORE: 3
HOW LIKELY ARE YOU TO NOD OFF OR FALL ASLEEP WHILE SITTING AND READING: SLIGHT CHANCE OF DOZING
HOW LIKELY ARE YOU TO NOD OFF OR FALL ASLEEP WHILE SITTING QUIETLY AFTER LUNCH WITHOUT ALCOHOL: WOULD NEVER DOZE
HOW LIKELY ARE YOU TO NOD OFF OR FALL ASLEEP WHILE LYING DOWN TO REST IN THE AFTERNOON WHEN CIRCUMSTANCES PERMIT: SLIGHT CHANCE OF DOZING
HOW LIKELY ARE YOU TO NOD OFF OR FALL ASLEEP WHILE WATCHING TV: SLIGHT CHANCE OF DOZING
NECK CIRCUMFERENCE (INCHES): 14
HOW LIKELY ARE YOU TO NOD OFF OR FALL ASLEEP IN A CAR, WHILE STOPPED FOR A FEW MINUTES IN TRAFFIC: WOULD NEVER DOZE
HOW LIKELY ARE YOU TO NOD OFF OR FALL ASLEEP WHILE SITTING AND TALKING TO SOMEONE: WOULD NEVER DOZE
HOW LIKELY ARE YOU TO NOD OFF OR FALL ASLEEP WHILE SITTING INACTIVE IN A PUBLIC PLACE: WOULD NEVER DOZE

## 2024-05-16 NOTE — ASSESSMENT & PLAN NOTE
Has recently lost close to 40 lbs. Stopped taking phentermine about 3 weeks ago.  Has follow-up with his PCP next week.

## 2024-05-16 NOTE — PROGRESS NOTES
Jonel DANIEL WittVentura presents today for   Chief Complaint   Patient presents with    Snoring    Sleep Problem       Is someone accompanying this pt? no    Is the patient using any DME equipment during OV? no    -DME Company: N/A    Have you ever had a sleep study done before? no    Depression Screenin/16/2024     1:10 PM   PHQ-9    Little interest or pleasure in doing things 0   Feeling down, depressed, or hopeless 0   PHQ-2 Score 0   PHQ-9 Total Score 0        [place] Sleepiness Scale:      2024     1:14 PM   Sleep Medicine   Sitting and reading 1   Watching TV 1   Sitting, inactive in a public place (e.g. a theatre or a meeting) 0   As a passenger in a car for an hour without a break 0   Lying down to rest in the afternoon when circumstances permit 1   Sitting and talking to someone 0   Sitting quietly after a lunch without alcohol 0   In a car, while stopped for a few minutes in traffic 0   [place] Sleepiness Score 3   Neck circumference (Inches) 14       Stop-Ban/16/2024     1:00 PM   STOP-BANG QUESTIONNAIRE   Are you a loud and/or regular snorer? 1   Do you often feel tired or groggy upon awakening or do you awaken with a headache? 0   Have you been observed to gasp or stop breathing during sleep? 1   Are you often tired or fatigued during wake time hours?  0   Do you fall asleep sitting, reading, watching TV or driving? 0   Do you often have problems with memory or concentration? 0   Do you have or are you being treated for high blood pressure? 0   Recent BMI (Calculated) 37.4   Is BMI greater than 35 kg/m2? 1=Yes   Age older than 50 years old? 0=No   Is your neck circumference greater than 17 inches (Male) or 16 inches (Female)? 0   Gender - Male 1=Yes   STOP-Bang Total Score 41.4         Coordination of Care:  1. Have you been to the ER, urgent care clinic since your last visit? Hospitalized since your last visit? no    2. Have you seen or consulted any other health care providers outside 
presents with    Snoring    Sleep Problem     HPI:      Jonel Ventura is a 43 y.o. male referred by Yasmine Brady APRN - CNP for a sleep evaluation. He complains of: Daytime fatigue/somnolence.    This referral was initially placed in January.  At the time, the patient weighed 260 pounds or so and was snoring very loudly and was told he had witnessed apneas and was sleepy during the day.    Since that time he went on phentermine and lost 40 pounds or so.  Some of his symptoms have improved but he still does snore and does not feel refreshed or like he slept very well and he wakes up in the morning.    He also has shiftwork issues which may be contributing to his daytime fatigue/sleepiness.    Currently, he gets into bed somewhere between 8 and 10 PM and it may take him 10 minutes to fall asleep or may take him an hour or 2.  He did have more difficult time sleeping when he was taking the phentermine.  He stopped that medication 3 weeks ago and it is easier to fall asleep.  He does say that he wakes up a lot and is \"a light sleeper\".  He may get up once in the night to use the restroom but sometimes not at all.  He generally is awake by 5 AM and cannot sleep past that even on the weekends.    Historically his wife is told he snores very loudly, stops breathing.  He has woken up choking/gasping for air in the past.  He does admit to some hypnagogic hallucinations.      Previous sleep evaluation and treatment has included- PSG, HSAT which allegedly did not demonstrate significant or treatable INDU    DOT/CDL - No  FAA/'s license -No    Previous Report(s) Reviewed: historical medical records, office notes, and referral letter(s). Pertinent data has been documented.    Sleep symptoms:  Yes No  Additional Comments   Snoring [x] []    Witness Apneas [x] []    Waking snorting/gasping [x] [] Has had this problem but not recently   Nocturia []    [x]    Legs kicking at night [] [x]    AM Headaches [] [x]

## 2024-05-16 NOTE — PATIENT INSTRUCTIONS
Please make a follow up appointment to discuss the results of your sleep study. If this is impossible for some reason, please send me a \"My Chart\" message so that I may get back with you in a timely manner.    The [facility] Sleep Lab is located in the [facility], adjacent to [facility]. The lab is on the second floor. The direct number to call for sleep study related questions is: [phone].    Please call our clinic back at [phone] or send a message on Smartfield if you have any questions or concerns or if you are experiencing any of the following:     You have not received a follow up appointment within 30 days prior the recommended follow up time.    If you are not tolerating treatment plan and/or not able to obtain equipment or prescribed medication(s).  if you are experiencing any difficulties with the Durable Medical Equipment  (DME) Company you may be using or is assigned to you.  Two weeks have passed and you have not received an appointment for a scheduled procedure.  Two weeks have passed since you underwent a test and/or procedure and you have not received your results.     If you are using a CPAP/BIPAP, or Home Ventilator Device- Please note the following.  Currently, many DMEs are experiencing supply chain difficulties and orders for equipment may be back logged several weeks.     Your  Durable Medical Equipment (DME ) company is supposed to provide you with replacement filters, tubing and masks. You can either call your DME when you need new supplies or you can arrange for an automatic shipment schedule.    Your need to be seen by our office at lat minimum of every 12 months in order to renew the prescription for these supplies.   Please make note of who your DME company is and their phone number.   Please make sure that you clean your mask and hosing on a regular basis.  Your DME can provide you with additional information regarding proper care and cleaning of your

## 2024-05-16 NOTE — ASSESSMENT & PLAN NOTE
Can revisit this in follow up.  Patient works as a  and we can go over strategies to optimize sleep in his work setting if needed.

## 2024-05-17 DIAGNOSIS — E66.01 CLASS 2 SEVERE OBESITY WITH SERIOUS COMORBIDITY AND BODY MASS INDEX (BMI) OF 37.0 TO 37.9 IN ADULT, UNSPECIFIED OBESITY TYPE (HCC): ICD-10-CM

## 2024-05-17 DIAGNOSIS — E78.2 MIXED HYPERLIPIDEMIA: ICD-10-CM

## 2024-05-17 DIAGNOSIS — E66.01 CLASS 2 SEVERE OBESITY WITH SERIOUS COMORBIDITY AND BODY MASS INDEX (BMI) OF 37.0 TO 37.9 IN ADULT, UNSPECIFIED OBESITY TYPE (HCC): Primary | ICD-10-CM

## 2024-05-21 ENCOUNTER — HOSPITAL ENCOUNTER (OUTPATIENT)
Age: 44
Discharge: HOME | End: 2024-05-24

## 2024-05-21 LAB — SENTARA SPECIMEN COLLECTION: NORMAL

## 2024-05-21 PROCEDURE — 99001 SPECIMEN HANDLING PT-LAB: CPT

## 2024-05-21 SDOH — ECONOMIC STABILITY: TRANSPORTATION INSECURITY
IN THE PAST 12 MONTHS, HAS LACK OF TRANSPORTATION KEPT YOU FROM MEETINGS, WORK, OR FROM GETTING THINGS NEEDED FOR DAILY LIVING?: NO

## 2024-05-21 SDOH — ECONOMIC STABILITY: FOOD INSECURITY: WITHIN THE PAST 12 MONTHS, THE FOOD YOU BOUGHT JUST DIDN'T LAST AND YOU DIDN'T HAVE MONEY TO GET MORE.: SOMETIMES TRUE

## 2024-05-21 SDOH — ECONOMIC STABILITY: INCOME INSECURITY: HOW HARD IS IT FOR YOU TO PAY FOR THE VERY BASICS LIKE FOOD, HOUSING, MEDICAL CARE, AND HEATING?: NOT VERY HARD

## 2024-05-21 SDOH — ECONOMIC STABILITY: FOOD INSECURITY: WITHIN THE PAST 12 MONTHS, YOU WORRIED THAT YOUR FOOD WOULD RUN OUT BEFORE YOU GOT MONEY TO BUY MORE.: SOMETIMES TRUE

## 2024-05-22 LAB
CHOLESTEROL, TOTAL: 150 MG/DL (ref 110–200)
CHOLESTEROL/HDL RATIO: 4.7 (ref 0–5)
ESTIMATED AVERAGE GLUCOSE: 120 MG/DL (ref 91–123)
HBA1C MFR BLD: 5.8 % (ref 4.8–5.6)
HDLC SERPL-MCNC: 32 MG/DL
LDL CHOLESTEROL: 99 MG/DL (ref 50–99)
LDL/HDL RATIO: 3.1
NON-HDL CHOLESTEROL: 118 MG/DL
TRIGL SERPL-MCNC: 95 MG/DL (ref 40–149)
VLDLC SERPL CALC-MCNC: 19 MG/DL (ref 8–30)

## 2024-05-24 ENCOUNTER — OFFICE VISIT (OUTPATIENT)
Dept: FAMILY MEDICINE CLINIC | Facility: CLINIC | Age: 44
End: 2024-05-24
Payer: COMMERCIAL

## 2024-05-24 VITALS
BODY MASS INDEX: 32.18 KG/M2 | TEMPERATURE: 97.8 F | RESPIRATION RATE: 18 BRPM | HEART RATE: 66 BPM | WEIGHT: 224.8 LBS | SYSTOLIC BLOOD PRESSURE: 130 MMHG | OXYGEN SATURATION: 98 % | HEIGHT: 70 IN | DIASTOLIC BLOOD PRESSURE: 82 MMHG

## 2024-05-24 DIAGNOSIS — M79.671 RIGHT FOOT PAIN: ICD-10-CM

## 2024-05-24 DIAGNOSIS — M25.571 ACUTE RIGHT ANKLE PAIN: Primary | ICD-10-CM

## 2024-05-24 DIAGNOSIS — R40.0 DAYTIME SOMNOLENCE: ICD-10-CM

## 2024-05-24 DIAGNOSIS — R73.03 PREDIABETES: ICD-10-CM

## 2024-05-24 DIAGNOSIS — E66.09 CLASS 1 OBESITY DUE TO EXCESS CALORIES WITH SERIOUS COMORBIDITY AND BODY MASS INDEX (BMI) OF 32.0 TO 32.9 IN ADULT: ICD-10-CM

## 2024-05-24 PROCEDURE — 99214 OFFICE O/P EST MOD 30 MIN: CPT | Performed by: NURSE PRACTITIONER

## 2024-05-24 ASSESSMENT — ENCOUNTER SYMPTOMS
CHEST TIGHTNESS: 0
WHEEZING: 0
SHORTNESS OF BREATH: 0

## 2024-05-24 ASSESSMENT — PATIENT HEALTH QUESTIONNAIRE - PHQ9
SUM OF ALL RESPONSES TO PHQ QUESTIONS 1-9: 0
2. FEELING DOWN, DEPRESSED OR HOPELESS: NOT AT ALL
SUM OF ALL RESPONSES TO PHQ QUESTIONS 1-9: 0
SUM OF ALL RESPONSES TO PHQ9 QUESTIONS 1 & 2: 0
1. LITTLE INTEREST OR PLEASURE IN DOING THINGS: NOT AT ALL
SUM OF ALL RESPONSES TO PHQ QUESTIONS 1-9: 0
SUM OF ALL RESPONSES TO PHQ QUESTIONS 1-9: 0

## 2024-05-24 NOTE — PROGRESS NOTES
Jonel Ventura is a 43 y.o. male presents with   Chief Complaint   Patient presents with    Follow-up        Diagnosis   1. Acute right ankle pain    Patient complains of right ankle right foot pain which she states developed roughly 1 month ago after twisting and falling.   2. Right foot pain    Patient complains of right ankle right foot pain which she states developed roughly 1 month ago after twisting and falling.   3. Daytime somnolence    Patient relays that he has upcoming sleep study device pickup scheduled for next week   4. Prediabetes    A1c down to 5.8%   5. Class 1 obesity due to excess calories with serious comorbidity and body mass index (BMI) of 32.0 to 32.9 in adult    Patient presents today in office after completing 3-month course of phentermine.  He has lost approximately 30 pounds states he has been off of the phentermine for the past 3 weeks.  He states he has made multiple dietary and lifestyle modifications.     /82 (Site: Right Upper Arm, Position: Sitting, Cuff Size: Large Adult)   Pulse 66   Temp 97.8 °F (36.6 °C) (Temporal)   Resp 18   Ht 1.778 m (5' 10\")   Wt 102 kg (224 lb 12.8 oz)   SpO2 98%   BMI 32.26 kg/m²   Subjective:     Past Medical History:   Diagnosis Date    Genital herpes simplex     HLD (hyperlipidemia)     Otitis externa     Vitamin D deficiency      Past Surgical History:   Procedure Laterality Date    APPENDECTOMY  09/05/2014     Social History     Socioeconomic History    Marital status:      Spouse name: None    Number of children: None    Years of education: None    Highest education level: None   Tobacco Use    Smoking status: Never    Smokeless tobacco: Never   Substance and Sexual Activity    Alcohol use: Not Currently    Drug use: Never    Sexual activity: Yes     Partners: Female     Social Determinants of Health     Financial Resource Strain: Low Risk  (5/21/2024)    Overall Financial Resource Strain (CARDIA)     Difficulty of Paying

## 2024-05-24 NOTE — PROGRESS NOTES
Jonel Ventura presents today for   Chief Complaint   Patient presents with    Follow-up       Is someone accompanying this pt? no    Is the patient using any DME equipment during OV? no    Depression Screenin/24/2024     8:14 AM 2024     1:10 PM 2024    10:22 AM 2023     8:52 AM 2023     8:33 AM 2022    10:25 AM 2022    12:48 PM   PHQ-9 Questionaire   Little interest or pleasure in doing things 0 0 0 0 0 0 0   Feeling down, depressed, or hopeless 0 0 0 0 0 0 0   PHQ-9 Total Score 0 0 0 0 0 0 0       Fall Risk       No data to display                 Health Maintenance reviewed and discussed and ordered per Provider.    There are no preventive care reminders to display for this patient..        \"Have you been to the ER, urgent care clinic since your last visit?  Hospitalized since your last visit?\"    YES - When: approximately 3 months ago.  Where and Why: Urgent Care - ear infection.    “Have you seen or consulted any other health care providers outside of [facility] since your last visit?”    NO

## 2024-05-29 ENCOUNTER — HOSPITAL ENCOUNTER (OUTPATIENT)
Dept: SLEEP MEDICINE | Facility: HOSPITAL | Age: 44
Discharge: HOME | End: 2024-06-01
Attending: OTOLARYNGOLOGY

## 2024-05-29 DIAGNOSIS — R06.83 SNORING: ICD-10-CM

## 2024-05-29 DIAGNOSIS — R06.81 WITNESSED APNEIC SPELLS: ICD-10-CM

## 2024-05-29 DIAGNOSIS — G47.19 EXCESSIVE DAYTIME SLEEPINESS: ICD-10-CM

## 2024-05-29 DIAGNOSIS — R29.818 SUSPECTED SLEEP APNEA: ICD-10-CM

## 2024-06-05 PROBLEM — G47.33 OSA (OBSTRUCTIVE SLEEP APNEA): Status: ACTIVE | Noted: 2024-06-05

## 2024-06-18 NOTE — ASSESSMENT & PLAN NOTE
Has recently lost close to 40 lbs. Stopped taking phentermine about 3 weeks ago.  Had recent follow up with his PCP   No

## 2024-06-18 NOTE — ASSESSMENT & PLAN NOTE
Patient works as a  and we can go over strategies to optimize sleep in his work setting if needed.

## 2024-06-19 ENCOUNTER — OFFICE VISIT (OUTPATIENT)
Age: 44
End: 2024-06-19

## 2024-06-19 VITALS
TEMPERATURE: 97.6 F | HEART RATE: 67 BPM | DIASTOLIC BLOOD PRESSURE: 78 MMHG | BODY MASS INDEX: 33.36 KG/M2 | RESPIRATION RATE: 18 BRPM | OXYGEN SATURATION: 96 % | HEIGHT: 70 IN | WEIGHT: 233 LBS | SYSTOLIC BLOOD PRESSURE: 122 MMHG

## 2024-06-19 DIAGNOSIS — R06.81 WITNESSED APNEIC SPELLS: ICD-10-CM

## 2024-06-19 DIAGNOSIS — G47.26 SHIFT WORK SLEEP DISORDER: ICD-10-CM

## 2024-06-19 DIAGNOSIS — R73.03 PREDIABETES: ICD-10-CM

## 2024-06-19 DIAGNOSIS — G47.19 EXCESSIVE DAYTIME SLEEPINESS: ICD-10-CM

## 2024-06-19 DIAGNOSIS — E66.3 OVERWEIGHT: ICD-10-CM

## 2024-06-19 DIAGNOSIS — G47.33 MILD OBSTRUCTIVE SLEEP APNEA: Primary | ICD-10-CM

## 2024-06-19 ASSESSMENT — SLEEP AND FATIGUE QUESTIONNAIRES
HOW LIKELY ARE YOU TO NOD OFF OR FALL ASLEEP IN A CAR, WHILE STOPPED FOR A FEW MINUTES IN TRAFFIC: WOULD NEVER DOZE
HOW LIKELY ARE YOU TO NOD OFF OR FALL ASLEEP WHILE SITTING INACTIVE IN A PUBLIC PLACE: SLIGHT CHANCE OF DOZING
HOW LIKELY ARE YOU TO NOD OFF OR FALL ASLEEP WHILE SITTING AND READING: SLIGHT CHANCE OF DOZING
HOW LIKELY ARE YOU TO NOD OFF OR FALL ASLEEP WHILE WATCHING TV: SLIGHT CHANCE OF DOZING
ESS TOTAL SCORE: 9
HOW LIKELY ARE YOU TO NOD OFF OR FALL ASLEEP WHEN YOU ARE A PASSENGER IN A CAR FOR AN HOUR WITHOUT A BREAK: MODERATE CHANCE OF DOZING
HOW LIKELY ARE YOU TO NOD OFF OR FALL ASLEEP WHILE SITTING AND TALKING TO SOMEONE: WOULD NEVER DOZE
HOW LIKELY ARE YOU TO NOD OFF OR FALL ASLEEP WHILE LYING DOWN TO REST IN THE AFTERNOON WHEN CIRCUMSTANCES PERMIT: MODERATE CHANCE OF DOZING
HOW LIKELY ARE YOU TO NOD OFF OR FALL ASLEEP WHILE SITTING QUIETLY AFTER LUNCH WITHOUT ALCOHOL: MODERATE CHANCE OF DOZING

## 2024-06-19 ASSESSMENT — PATIENT HEALTH QUESTIONNAIRE - PHQ9
SUM OF ALL RESPONSES TO PHQ9 QUESTIONS 1 & 2: 0
SUM OF ALL RESPONSES TO PHQ QUESTIONS 1-9: 0
1. LITTLE INTEREST OR PLEASURE IN DOING THINGS: NOT AT ALL
2. FEELING DOWN, DEPRESSED OR HOPELESS: NOT AT ALL
SUM OF ALL RESPONSES TO PHQ QUESTIONS 1-9: 0

## 2024-06-19 NOTE — PROGRESS NOTES
Jonel Wittand presents today for   Chief Complaint   Patient presents with    Follow-up    Results       Is someone accompanying this pt? no    Is the patient using any DME equipment during OV? no    -DME Company: N/A    Depression Screenin/19/2024     8:32 AM   PHQ-9    Little interest or pleasure in doing things 0   Feeling down, depressed, or hopeless 0   PHQ-2 Score 0   PHQ-9 Total Score 0        Feura Bush Sleepiness Scale:      2024     8:33 AM   Sleep Medicine   Sitting and reading 1   Watching TV 1   Sitting, inactive in a public place (e.g. a theatre or a meeting) 1   As a passenger in a car for an hour without a break 2   Lying down to rest in the afternoon when circumstances permit 2   Sitting and talking to someone 0   Sitting quietly after a lunch without alcohol 2   In a car, while stopped for a few minutes in traffic 0   Feura Bush Sleepiness Score 9       Stop-Ban/16/2024     1:00 PM   STOP-BANG QUESTIONNAIRE   Are you a loud and/or regular snorer? 1   Do you often feel tired or groggy upon awakening or do you awaken with a headache? 0   Have you been observed to gasp or stop breathing during sleep? 1   Are you often tired or fatigued during wake time hours?  0   Do you fall asleep sitting, reading, watching TV or driving? 0   Do you often have problems with memory or concentration? 0   Do you have or are you being treated for high blood pressure? 0   Recent BMI (Calculated) 37.4   Is BMI greater than 35 kg/m2? 1=Yes   Age older than 50 years old? 0=No   Is your neck circumference greater than 17 inches (Male) or 16 inches (Female)? 0   Gender - Male 1=Yes   STOP-Bang Total Score 41.4           Coordination of Care:  1. Have you been to the ER, urgent care clinic since your last visit? Hospitalized since your last visit?  no    2. Have you seen or consulted any other health care providers outside of the [facility] since your last visit? Include any pap smears or 
TAKE ONE (1) CAPSULE BY MOUTH ONCE A WEEK       Allergies as of 06/19/2024    (No Known Allergies)       Patient Active Problem List   Diagnosis    Prediabetes    Overweight    Shift work sleep disorder    INDU (obstructive sleep apnea)       Past Medical History:   Diagnosis Date    Genital herpes simplex     HLD (hyperlipidemia)     Otitis externa     Vitamin D deficiency        Past Surgical History:   Procedure Laterality Date    APPENDECTOMY  09/05/2014       Family History   Problem Relation Age of Onset    Cancer Mother     Sleep Apnea Mother     Sleep Apnea Sister     Sleep Apnea Brother        Objective:     Vitals:    Weight BMI   Wt Readings from Last 3 Encounters:   06/19/24 105.7 kg (233 lb)   05/24/24 102 kg (224 lb 12.8 oz)   05/16/24 101.6 kg (224 lb)    Body mass index is 33.43 kg/m².     BP HR SaO2   BP Readings from Last 3 Encounters:   06/19/24 122/78   05/24/24 130/82   05/16/24 122/79    Pulse Readings from Last 3 Encounters:   06/19/24 67   05/24/24 66   05/16/24 64    SpO2 Readings from Last 3 Encounters:   06/19/24 96%   05/24/24 98%   05/16/24 97%          Physical Exam  Constitutional:       Appearance: Normal appearance.   HENT:      Head: Normocephalic and atraumatic.   Eyes:      Conjunctiva/sclera: Conjunctivae normal.      Pupils: Pupils are equal, round, and reactive to light.      Comments: Right eye with strabismus   Cardiovascular:      Rate and Rhythm: Normal rate.   Pulmonary:      Effort: Pulmonary effort is normal.   Neurological:      General: No focal deficit present.      Mental Status: He is alert and oriented to person, place, and time.   Psychiatric:         Behavior: Behavior normal.       The mandibular molar Class :   [x]1 []2 []3        Mallampati I Airway Classification:   []1 []2 [x]3 []4      Data reviewed:    Hemoglobin A1C   Date Value Ref Range Status   05/21/2024 5.8 (H) 4.8 - 5.6 % Final        Lab Results   Component Value Date/Time     01/16/2024 05:13

## 2024-06-19 NOTE — PATIENT INSTRUCTIONS
Please make a follow up appointment to discuss the results of your sleep study. If this is impossible for some reason, please send me a \"My Chart\" message so that I may get back with you in a timely manner.    The [facility] Sleep Lab is located in the [facility], adjacent to [facility]. The lab is on the second floor. The direct number to call for sleep study related questions is: [phone].    Please call our clinic back at [phone] or send a message on [employer] if you have any questions or concerns or if you are experiencing any of the following:     You have not received a follow up appointment within 30 days prior the recommended follow up time.    If you are not tolerating treatment plan and/or not able to obtain equipment or prescribed medication(s).  if you are experiencing any difficulties with the Durable Medical Equipment  (DME) Company you may be using or is assigned to you.  Two weeks have passed and you have not received an appointment for a scheduled procedure.  Two weeks have passed since you underwent a test and/or procedure and you have not received your results.     If you are using a CPAP/BIPAP, or Home Ventilator Device- Please note the following.  Currently, many DMEs are experiencing supply chain difficulties and orders for equipment may be back logged several weeks.     Your  Durable Medical Equipment (DME ) company is supposed to provide you with replacement filters, tubing and masks. You can either call your DME when you need new supplies or you can arrange for an automatic shipment schedule.    Your need to be seen by our office at lat minimum of every 12 months in order to renew the prescription for these supplies.   Please make note of who your DME company is and their phone number.   Please make sure that you clean your mask and hosing on a regular basis.  Your DME can provide you with additional information regarding proper care and cleaning of your

## 2024-06-28 SDOH — HEALTH STABILITY: PHYSICAL HEALTH: ON AVERAGE, HOW MANY MINUTES DO YOU ENGAGE IN EXERCISE AT THIS LEVEL?: 20 MIN

## 2024-06-28 SDOH — HEALTH STABILITY: PHYSICAL HEALTH: ON AVERAGE, HOW MANY DAYS PER WEEK DO YOU ENGAGE IN MODERATE TO STRENUOUS EXERCISE (LIKE A BRISK WALK)?: 4 DAYS

## 2024-07-01 ENCOUNTER — OFFICE VISIT (OUTPATIENT)
Age: 44
End: 2024-07-01
Payer: COMMERCIAL

## 2024-07-01 VITALS — HEIGHT: 70 IN | BODY MASS INDEX: 32.71 KG/M2

## 2024-07-01 DIAGNOSIS — Z87.39 HX OF GOUT: ICD-10-CM

## 2024-07-01 DIAGNOSIS — M79.671 RIGHT FOOT PAIN: ICD-10-CM

## 2024-07-01 DIAGNOSIS — M19.071 PRIMARY OSTEOARTHRITIS OF RIGHT FOOT: ICD-10-CM

## 2024-07-01 DIAGNOSIS — M77.51 BURSITIS OF RIGHT FOOT: Primary | ICD-10-CM

## 2024-07-01 PROCEDURE — 99203 OFFICE O/P NEW LOW 30 MIN: CPT | Performed by: ORTHOPAEDIC SURGERY

## 2024-07-01 PROCEDURE — 73630 X-RAY EXAM OF FOOT: CPT | Performed by: ORTHOPAEDIC SURGERY

## 2024-07-01 NOTE — PROGRESS NOTES
health maintenance as recommended.       Jonel Ventura  expresses understanding of the diagnosis, treatment plan, and all of their proposed questions were answered to their satisfaction. Patient education has been provided re the diagnoses.       HPI //  OBJECTIVE EXAMINATION      Jonel Ventura IS A 43 y.o. male who is a/an  new patient, presenting to my outpatient office for evaluation of  the following chief complaint(s):     Chief Complaint   Patient presents with    Foot Pain     right       Jonel Ventura reports that he has had intermittent pain, to the right great toe first MTP, and the medial aspect.  He informs me that he had seen his foot and ankle podiatrist at [facility], for great toe pain, and he describes having an aspiration of this joint, with fluid confirmation that he has gouty crystals of uric acid crystals.  At 1 point he was started on colchicine, for a gouty attack,.  He is a , and as well as as a scalp master.  She does quite a bit of hiking.  More recently, he got in [place], he is wearing his Keen shoes, and then he developed pain discomfort and bruising the medial portion of the right great toe first MTP region, no redness or erythema, so it seems that that the KEEN boot bothered t his great toe.  The last time he describes, having a gouty attack, was about a year ago, when they went to [facility].    He was treated by [facility] specialist, he describes, for planta fasciitis, the dorsal night splint, stretching program custom orthotics.    Chief complaint, is pain discomfort medial portion right great toe that is intermittent, but is not as bad today as it was few weeks ago.  He has been trying to wear wider cyst type shoes and this has helped alleviate his discomfort.  On Father's Day, this year, June 2024, he had fallen and twisted his ankle, and when he did he describes more of Achilles tendon strain, and or an ankle sprain.  Shoe, valgus

## 2024-07-01 NOTE — PATIENT INSTRUCTIONS
If we order a Diagnostic test (such as MRI or CT) during your office visit please see below:     Coordination of Care will be calling you to schedule your diagnostic test. If you have not heard from Coordination of Care within 2 business days, please call [phone].     Once you have a date scheduled for your diagnostic test, you will need to contact our office to schedule a follow up appointment about 4 days following the exam, as this is when the physician will review your diagnostic test results with you. You can contact our office to schedule appointment by phone at [phone], or you can send a message via [employer] to request an appointment.

## 2024-07-02 ENCOUNTER — CLINICAL DOCUMENTATION (OUTPATIENT)
Age: 44
End: 2024-07-02

## 2024-08-20 NOTE — PROGRESS NOTES
[facility]   Sleep Medicine     Office Progress Note         [address]  [address]  [facility] [address] [phone] Fax    Reason for visit/referral:  follow up Obstructive Sleep Apnea on cpap  Assessment:    1. Mild obstructive sleep apnea  Assessment & Plan:   AHI 7.8.  Continuing to work on using his CPAP machine is discussed below.  Orders:  -     DME - DURABLE MEDICAL EQUIPMENT  2. Shift work sleep disorder  3. Excessive daytime sleepiness  Comments:  Whitakers sleep score today is 4, was 9 at last visit out of 24.  Could be due to schedule and/or CPAP usage, will continue to follow  4. Prediabetes  5. Snoring    In reviewing the patient's download data today, his compliance is at 39% for more than 4 hours usage.  His residual AHI is low at 0.4 and he is using his machine, on average, 4 hours and 29 minutes per night.    His settings appear to be appropriate but he is struggling to use the machine on a regular basis.  When discussing this with him today, he first had a nasal mask when he got his machine which he did not like at all and it did not fit well and he was opening his mouth.  He contacted [facility] and they sent him an oral nasal mask but unfortunately it is rubbing on the sides of his nose and is uncomfortable.  He was not fitted for this mask so it is unclear if actually fits correctly.    On some nights he is able to use his CPAP machine for 8 hours and does say that he feels more rested and feels more energetic when he wakes up.  There are nights when he has trouble using machine because he is on-call as a  and then there were several nights he could not use it because his nasal mask did not fit.    He asked me today if he should see benefit by now and I told him that he has not used it consistently enough for a long enough period of time to be able to answer that question but I think he will get benefit.    I have also asked him to

## 2024-08-21 ENCOUNTER — OFFICE VISIT (OUTPATIENT)
Age: 44
End: 2024-08-21
Payer: COMMERCIAL

## 2024-08-21 VITALS
BODY MASS INDEX: 34.65 KG/M2 | TEMPERATURE: 97.7 F | SYSTOLIC BLOOD PRESSURE: 129 MMHG | OXYGEN SATURATION: 96 % | WEIGHT: 242 LBS | DIASTOLIC BLOOD PRESSURE: 78 MMHG | RESPIRATION RATE: 18 BRPM | HEART RATE: 64 BPM | HEIGHT: 70 IN

## 2024-08-21 DIAGNOSIS — R73.03 PREDIABETES: ICD-10-CM

## 2024-08-21 DIAGNOSIS — G47.33 MILD OBSTRUCTIVE SLEEP APNEA: Primary | ICD-10-CM

## 2024-08-21 DIAGNOSIS — G47.26 SHIFT WORK SLEEP DISORDER: ICD-10-CM

## 2024-08-21 DIAGNOSIS — G47.19 EXCESSIVE DAYTIME SLEEPINESS: ICD-10-CM

## 2024-08-21 DIAGNOSIS — R06.83 SNORING: ICD-10-CM

## 2024-08-21 PROCEDURE — 99214 OFFICE O/P EST MOD 30 MIN: CPT | Performed by: OTOLARYNGOLOGY

## 2024-08-21 ASSESSMENT — PATIENT HEALTH QUESTIONNAIRE - PHQ9
SUM OF ALL RESPONSES TO PHQ9 QUESTIONS 1 & 2: 0
SUM OF ALL RESPONSES TO PHQ QUESTIONS 1-9: 0
2. FEELING DOWN, DEPRESSED OR HOPELESS: NOT AT ALL
1. LITTLE INTEREST OR PLEASURE IN DOING THINGS: NOT AT ALL
SUM OF ALL RESPONSES TO PHQ QUESTIONS 1-9: 0

## 2024-08-21 ASSESSMENT — SLEEP AND FATIGUE QUESTIONNAIRES
HOW LIKELY ARE YOU TO NOD OFF OR FALL ASLEEP WHEN YOU ARE A PASSENGER IN A CAR FOR AN HOUR WITHOUT A BREAK: SLIGHT CHANCE OF DOZING
HOW LIKELY ARE YOU TO NOD OFF OR FALL ASLEEP WHILE SITTING QUIETLY AFTER LUNCH WITHOUT ALCOHOL: SLIGHT CHANCE OF DOZING
ESS TOTAL SCORE: 4
HOW LIKELY ARE YOU TO NOD OFF OR FALL ASLEEP WHILE LYING DOWN TO REST IN THE AFTERNOON WHEN CIRCUMSTANCES PERMIT: SLIGHT CHANCE OF DOZING
HOW LIKELY ARE YOU TO NOD OFF OR FALL ASLEEP IN A CAR, WHILE STOPPED FOR A FEW MINUTES IN TRAFFIC: WOULD NEVER DOZE
HOW LIKELY ARE YOU TO NOD OFF OR FALL ASLEEP WHILE SITTING AND TALKING TO SOMEONE: WOULD NEVER DOZE
HOW LIKELY ARE YOU TO NOD OFF OR FALL ASLEEP WHILE WATCHING TV: SLIGHT CHANCE OF DOZING
HOW LIKELY ARE YOU TO NOD OFF OR FALL ASLEEP WHILE SITTING AND READING: WOULD NEVER DOZE
HOW LIKELY ARE YOU TO NOD OFF OR FALL ASLEEP WHILE SITTING INACTIVE IN A PUBLIC PLACE: WOULD NEVER DOZE

## 2024-08-21 NOTE — PATIENT INSTRUCTIONS
Please make a follow up appointment to discuss the results of your sleep study. If this is impossible for some reason, please send me a \"My Chart\" message so that I may get back with you in a timely manner.    The [facility] Sleep Lab is located in the [facility], adjacent to [facility]. The lab is on the second floor. The direct number to call for sleep study related questions is: [phone].    Please call our clinic back at [phone] or send a message on [employer] if you have any questions or concerns or if you are experiencing any of the following:     You have not received a follow up appointment within 30 days prior the recommended follow up time.    If you are not tolerating treatment plan and/or not able to obtain equipment or prescribed medication(s).  if you are experiencing any difficulties with the Durable Medical Equipment  (DME) Company you may be using or is assigned to you.  Two weeks have passed and you have not received an appointment for a scheduled procedure.  Two weeks have passed since you underwent a test and/or procedure and you have not received your results.     If you are using a CPAP/BIPAP, or Home Ventilator Device- Please note the following.  Currently, many DMEs are experiencing supply chain difficulties and orders for equipment may be back logged several weeks.     Your  Durable Medical Equipment (DME ) company is supposed to provide you with replacement filters, tubing and masks. You can either call your DME when you need new supplies or you can arrange for an automatic shipment schedule.    Your need to be seen by our office at lat minimum of every 12 months in order to renew the prescription for these supplies.   Please make note of who your DME company is and their phone number.   Please make sure that you clean your mask and hosing on a regular basis.  Your DME can provide you with additional information regarding proper care and cleaning of your

## 2024-08-27 ENCOUNTER — CLINICAL DOCUMENTATION (OUTPATIENT)
Age: 44
End: 2024-08-27

## 2024-08-29 ENCOUNTER — HOSPITAL ENCOUNTER (OUTPATIENT)
Age: 44
Discharge: HOME | End: 2024-09-01

## 2024-08-29 DIAGNOSIS — R73.03 PREDIABETES: Primary | ICD-10-CM

## 2024-08-29 DIAGNOSIS — E55.9 VITAMIN D DEFICIENCY: ICD-10-CM

## 2024-08-29 DIAGNOSIS — E66.09 CLASS 1 OBESITY DUE TO EXCESS CALORIES WITH SERIOUS COMORBIDITY AND BODY MASS INDEX (BMI) OF 32.0 TO 32.9 IN ADULT: ICD-10-CM

## 2024-08-29 DIAGNOSIS — E53.8 COBALAMIN DEFICIENCY: ICD-10-CM

## 2024-08-29 DIAGNOSIS — E78.2 MIXED HYPERLIPIDEMIA: ICD-10-CM

## 2024-08-29 DIAGNOSIS — R73.03 PREDIABETES: ICD-10-CM

## 2024-08-29 LAB — SENTARA SPECIMEN COLLECTION: NORMAL

## 2024-08-29 PROCEDURE — 99001 SPECIMEN HANDLING PT-LAB: CPT

## 2024-08-30 LAB
A/G RATIO: 2.2 RATIO (ref 1.1–2.6)
ALBUMIN: 4.7 G/DL (ref 3.5–5)
ALP BLD-CCNC: 49 U/L (ref 25–115)
ALT SERPL-CCNC: 51 U/L (ref 5–40)
ANION GAP SERPL CALCULATED.3IONS-SCNC: 12 MMOL/L (ref 3–15)
AST SERPL-CCNC: 34 U/L (ref 10–37)
BILIRUB SERPL-MCNC: 0.4 MG/DL (ref 0.2–1.2)
BUN BLDV-MCNC: 16 MG/DL (ref 6–22)
CALCIUM SERPL-MCNC: 9.5 MG/DL (ref 8.4–10.5)
CHLORIDE BLD-SCNC: 105 MMOL/L (ref 98–110)
CHOLESTEROL, TOTAL: 130 MG/DL (ref 110–200)
CHOLESTEROL/HDL RATIO: 3.9 (ref 0–5)
CO2: 22 MMOL/L (ref 20–32)
CREAT SERPL-MCNC: 1.2 MG/DL (ref 0.5–1.2)
ESTIMATED AVERAGE GLUCOSE: 119 MG/DL (ref 91–123)
GFR, ESTIMATED: >60 ML/MIN/1.73 SQ.M.
GLOBULIN: 2.1 G/DL (ref 2–4)
GLUCOSE: 115 MG/DL (ref 70–99)
HBA1C MFR BLD: 5.8 % (ref 4.8–5.6)
HCT VFR BLD CALC: 45.5 % (ref 36.6–51.9)
HDLC SERPL-MCNC: 33 MG/DL
HEMOGLOBIN: 14.3 G/DL (ref 13.2–17.3)
LDL CHOLESTEROL: 73 MG/DL (ref 50–99)
LDL/HDL RATIO: 2.2
MCH RBC QN AUTO: 29 PG (ref 26–34)
MCHC RBC AUTO-ENTMCNC: 31 G/DL (ref 31–36)
MCV RBC AUTO: 92 FL (ref 80–95)
NON-HDL CHOLESTEROL: 97 MG/DL
PDW BLD-RTO: 13.7 % (ref 10–15.5)
PLATELET # BLD: 285 K/UL (ref 140–440)
PMV BLD AUTO: 9.9 FL (ref 9–13)
POTASSIUM SERPL-SCNC: 4 MMOL/L (ref 3.5–5.5)
RBC # BLD: 4.96 M/UL (ref 3.8–5.8)
SODIUM BLD-SCNC: 139 MMOL/L (ref 133–145)
TOTAL PROTEIN: 6.8 G/DL (ref 6.4–8.3)
TRIGL SERPL-MCNC: 120 MG/DL (ref 40–149)
TSH SERPL DL<=0.05 MIU/L-ACNC: 1.77 MCU/ML (ref 0.27–4.2)
VITAMIN B-12: 342 PG/ML (ref 211–911)
VITAMIN D 25-HYDROXY: 39.1 NG/ML (ref 32–100)
VLDLC SERPL CALC-MCNC: 24 MG/DL (ref 8–30)
WBC # BLD: 6 K/UL (ref 4–11)

## 2024-09-05 ENCOUNTER — OFFICE VISIT (OUTPATIENT)
Facility: CLINIC | Age: 44
End: 2024-09-05
Payer: COMMERCIAL

## 2024-09-05 VITALS
TEMPERATURE: 97.6 F | OXYGEN SATURATION: 98 % | HEIGHT: 70 IN | HEART RATE: 82 BPM | BODY MASS INDEX: 35.48 KG/M2 | DIASTOLIC BLOOD PRESSURE: 81 MMHG | RESPIRATION RATE: 18 BRPM | SYSTOLIC BLOOD PRESSURE: 118 MMHG | WEIGHT: 247.8 LBS

## 2024-09-05 DIAGNOSIS — E66.812 CLASS 2 SEVERE OBESITY WITH SERIOUS COMORBIDITY AND BODY MASS INDEX (BMI) OF 37.0 TO 37.9 IN ADULT, UNSPECIFIED OBESITY TYPE: ICD-10-CM

## 2024-09-05 DIAGNOSIS — E78.2 MIXED HYPERLIPIDEMIA: ICD-10-CM

## 2024-09-05 DIAGNOSIS — R79.89 ELEVATED LFTS: ICD-10-CM

## 2024-09-05 DIAGNOSIS — E66.01 CLASS 2 SEVERE OBESITY WITH SERIOUS COMORBIDITY AND BODY MASS INDEX (BMI) OF 37.0 TO 37.9 IN ADULT, UNSPECIFIED OBESITY TYPE: ICD-10-CM

## 2024-09-05 DIAGNOSIS — E66.01 CLASS 2 SEVERE OBESITY DUE TO EXCESS CALORIES WITH SERIOUS COMORBIDITY AND BODY MASS INDEX (BMI) OF 35.0 TO 35.9 IN ADULT (HCC): Primary | ICD-10-CM

## 2024-09-05 PROCEDURE — 99214 OFFICE O/P EST MOD 30 MIN: CPT | Performed by: NURSE PRACTITIONER

## 2024-09-05 RX ORDER — PHENTERMINE HYDROCHLORIDE 37.5 MG/1
37.5 CAPSULE ORAL EVERY MORNING
Qty: 30 CAPSULE | Refills: 2 | OUTPATIENT
Start: 2024-09-05 | End: 2024-10-05

## 2024-09-05 RX ORDER — PHENTERMINE HYDROCHLORIDE 37.5 MG/1
37.5 TABLET ORAL
Qty: 30 TABLET | Refills: 2 | Status: SHIPPED | OUTPATIENT
Start: 2024-09-05 | End: 2024-12-04

## 2024-09-05 ASSESSMENT — PATIENT HEALTH QUESTIONNAIRE - PHQ9
1. LITTLE INTEREST OR PLEASURE IN DOING THINGS: NOT AT ALL
SUM OF ALL RESPONSES TO PHQ QUESTIONS 1-9: 0
2. FEELING DOWN, DEPRESSED OR HOPELESS: NOT AT ALL
SUM OF ALL RESPONSES TO PHQ9 QUESTIONS 1 & 2: 0
SUM OF ALL RESPONSES TO PHQ QUESTIONS 1-9: 0

## 2024-09-05 ASSESSMENT — ENCOUNTER SYMPTOMS: SHORTNESS OF BREATH: 0

## 2024-09-05 NOTE — PROGRESS NOTES
Jonel Ventura presents today for   Chief Complaint   Patient presents with    Follow-up       Is someone accompanying this pt? no    Is the patient using any DME equipment during OV? no    Depression Screenin/5/2024     8:57 AM 2024     8:20 AM 2024     8:32 AM 2024     8:14 AM 2024     1:10 PM 2024    10:22 AM 2023     8:52 AM   PHQ-9 Questionaire   Little interest or pleasure in doing things 0 0 0 0 0 0 0   Feeling down, depressed, or hopeless 0 0 0 0 0 0 0   PHQ-9 Total Score 0 0 0 0 0 0 0       Fall Risk       No data to display                 Health Maintenance reviewed and discussed and ordered per Provider.    Health Maintenance Due   Topic Date Due    Flu vaccine (1) 2024    COVID-19 Vaccine (3 - 2023-24 season) 2024   .        \"Have you been to the ER, urgent care clinic since your last visit?  Hospitalized since your last visit?\"    NO    “Have you seen or consulted any other health care providers outside of [facility] since your last visit?”    NO          
  phentermine (ADIPEX-P) 37.5 MG tablet; Take 1 tablet by mouth every morning (before breakfast) for 90 days. Max Daily Amount: 37.5 mg, Disp-30 tablet, R-2Normal  Patient will continue to work to improve dietary and lifestyle modifications and I have authorized additional 90-day treatment with phentermine  2. Mixed hyperlipidemia  -     Lipid Panel; Future  He will decrease atorvastatin to 20 mg at bedtime reassess lipids in 3 months  3. Elevated LFTs  -     Hepatic Function Panel; Future  I relayed to the patient that I suspect this isolated increase in ALT is most likely attributed to dietary habits over the summer.  We will reassess in 3 months       Medication List            Accurate as of September 5, 2024  9:25 AM. If you have any questions, ask your nurse or doctor.                START taking these medications      phentermine 37.5 MG tablet  Commonly known as: ADIPEX-P  Take 1 tablet by mouth every morning (before breakfast) for 90 days. Max Daily Amount: 37.5 mg  Started by: AGATHA Almeida - CNP            CONTINUE taking these medications      atorvastatin 40 MG tablet  Commonly known as: LIPITOR  Take 1 tablet by mouth nightly     B-D SYRINGE LUER-HAL 1CC 1 ML Misc  Generic drug: Syringe (Disposable)  USE AS DIRECTED     BD Disp Needles 25G X 5/8\" Misc  Generic drug: NEEDLE (DISP) 25 G  USE THIS NEEDLE TO INJECT INTRAMUSCULARLY 0.5 ML WEEKLY     BD Hypodermic Needle 18G X 1\" Misc  Generic drug: NEEDLE (DISP) 18 G  USE THIS NEEDLE TO DRAW UP 0.5ML TESTOSTERONE CYPIONATE     colchicine 0.6 MG tablet  Commonly known as: Colcrys  1.2 mg po at onset of gout flare; then 0.6 mg po 1h later x 1; max 1.8 mg/24h; repeat daily until s/s resolve DO NOT TAKE THIS WHILE TAKING KETOCONAZOLE     fenofibrate 145 MG tablet  Commonly known as: Tricor  Take 1 tablet by mouth daily     KRILL OIL OMEGA-3 PO     testosterone cypionate 200 MG/ML injection  Commonly known as: DEPOTESTOTERONE CYPIONATE  INJECT INTRAMUSCULARLY

## 2024-12-03 DIAGNOSIS — R79.89 ELEVATED LFTS: ICD-10-CM

## 2024-12-03 DIAGNOSIS — E78.2 MIXED HYPERLIPIDEMIA: ICD-10-CM

## 2024-12-30 RX ORDER — ERGOCALCIFEROL 1.25 MG/1
CAPSULE ORAL
Qty: 12 CAPSULE | Refills: 3 | Status: SHIPPED | OUTPATIENT
Start: 2024-12-30

## 2025-02-17 RX ORDER — ATORVASTATIN CALCIUM 40 MG/1
40 TABLET, FILM COATED ORAL
Qty: 90 TABLET | Refills: 3 | Status: SHIPPED | OUTPATIENT
Start: 2025-02-17

## 2025-02-17 RX ORDER — FENOFIBRATE 145 MG/1
145 TABLET, COATED ORAL DAILY
Qty: 90 TABLET | Refills: 3 | Status: SHIPPED | OUTPATIENT
Start: 2025-02-17

## 2025-03-10 ENCOUNTER — HOSPITAL ENCOUNTER (OUTPATIENT)
Age: 45
Discharge: HOME | End: 2025-03-13

## 2025-03-10 LAB — SENTARA SPECIMEN COLLECTION: NORMAL

## 2025-03-10 PROCEDURE — 99001 SPECIMEN HANDLING PT-LAB: CPT

## 2025-03-10 SDOH — ECONOMIC STABILITY: FOOD INSECURITY: WITHIN THE PAST 12 MONTHS, YOU WORRIED THAT YOUR FOOD WOULD RUN OUT BEFORE YOU GOT MONEY TO BUY MORE.: SOMETIMES TRUE

## 2025-03-10 SDOH — ECONOMIC STABILITY: FOOD INSECURITY: WITHIN THE PAST 12 MONTHS, THE FOOD YOU BOUGHT JUST DIDN'T LAST AND YOU DIDN'T HAVE MONEY TO GET MORE.: SOMETIMES TRUE

## 2025-03-10 SDOH — ECONOMIC STABILITY: TRANSPORTATION INSECURITY
IN THE PAST 12 MONTHS, HAS THE LACK OF TRANSPORTATION KEPT YOU FROM MEDICAL APPOINTMENTS OR FROM GETTING MEDICATIONS?: NO

## 2025-03-10 SDOH — ECONOMIC STABILITY: INCOME INSECURITY: IN THE LAST 12 MONTHS, WAS THERE A TIME WHEN YOU WERE NOT ABLE TO PAY THE MORTGAGE OR RENT ON TIME?: NO

## 2025-03-13 ENCOUNTER — TELEPHONE (OUTPATIENT)
Dept: FAMILY MEDICINE CLINIC | Facility: CLINIC | Age: 45
End: 2025-03-13

## 2025-03-13 ENCOUNTER — OFFICE VISIT (OUTPATIENT)
Dept: FAMILY MEDICINE CLINIC | Facility: CLINIC | Age: 45
End: 2025-03-13
Payer: COMMERCIAL

## 2025-03-13 VITALS
DIASTOLIC BLOOD PRESSURE: 83 MMHG | OXYGEN SATURATION: 98 % | HEIGHT: 70 IN | SYSTOLIC BLOOD PRESSURE: 130 MMHG | BODY MASS INDEX: 37.51 KG/M2 | TEMPERATURE: 97.8 F | RESPIRATION RATE: 18 BRPM | HEART RATE: 87 BPM | WEIGHT: 262 LBS

## 2025-03-13 DIAGNOSIS — G89.29 CHRONIC PAIN OF LEFT THUMB: Primary | ICD-10-CM

## 2025-03-13 DIAGNOSIS — M79.645 CHRONIC PAIN OF LEFT THUMB: Primary | ICD-10-CM

## 2025-03-13 DIAGNOSIS — E66.811 CLASS 1 OBESITY WITHOUT SERIOUS COMORBIDITY WITH BODY MASS INDEX (BMI) OF 31.0 TO 31.9 IN ADULT, UNSPECIFIED OBESITY TYPE: ICD-10-CM

## 2025-03-13 DIAGNOSIS — Z12.83 SCREENING EXAM FOR SKIN CANCER: ICD-10-CM

## 2025-03-13 DIAGNOSIS — K21.9 GASTROESOPHAGEAL REFLUX DISEASE WITHOUT ESOPHAGITIS: ICD-10-CM

## 2025-03-13 DIAGNOSIS — R21 SKIN RASH: ICD-10-CM

## 2025-03-13 PROCEDURE — 99214 OFFICE O/P EST MOD 30 MIN: CPT | Performed by: NURSE PRACTITIONER

## 2025-03-13 RX ORDER — OMEPRAZOLE 40 MG/1
CAPSULE, DELAYED RELEASE ORAL
COMMUNITY
Start: 2025-01-22

## 2025-03-13 RX ORDER — SEMAGLUTIDE 0.5 MG/.5ML
INJECTION, SOLUTION SUBCUTANEOUS
Qty: 2 ML | Refills: 0 | Status: SHIPPED | OUTPATIENT
Start: 2025-03-13

## 2025-03-13 RX ORDER — SEMAGLUTIDE 0.25 MG/.5ML
0.25 INJECTION, SOLUTION SUBCUTANEOUS
Qty: 2 ML | Refills: 0 | Status: SHIPPED | OUTPATIENT
Start: 2025-03-13

## 2025-03-13 RX ORDER — SEMAGLUTIDE 1 MG/.5ML
INJECTION, SOLUTION SUBCUTANEOUS
Qty: 2 ML | Refills: 0 | Status: SHIPPED | OUTPATIENT
Start: 2025-03-13

## 2025-03-13 ASSESSMENT — PATIENT HEALTH QUESTIONNAIRE - PHQ9
SUM OF ALL RESPONSES TO PHQ QUESTIONS 1-9: 0
SUM OF ALL RESPONSES TO PHQ QUESTIONS 1-9: 0
2. FEELING DOWN, DEPRESSED OR HOPELESS: NOT AT ALL
SUM OF ALL RESPONSES TO PHQ QUESTIONS 1-9: 0
SUM OF ALL RESPONSES TO PHQ QUESTIONS 1-9: 0
1. LITTLE INTEREST OR PLEASURE IN DOING THINGS: NOT AT ALL

## 2025-03-13 NOTE — PROGRESS NOTES
Jonel Ventura presents today for   Chief Complaint   Patient presents with    Follow-up       Is someone accompanying this pt? no    Is the patient using any DME equipment during OV? no    Depression Screenin/5/2024     8:57 AM 2024     8:20 AM 2024     8:32 AM 2024     8:14 AM 2024     1:10 PM 2024    10:22 AM 2023     8:52 AM   PHQ-9 Questionaire   Little interest or pleasure in doing things 0 0 0 0 0 0 0   Feeling down, depressed, or hopeless 0 0 0 0 0 0 0   PHQ-9 Total Score 0 0 0 0 0 0 0       Fall Risk       No data to display                 Health Maintenance reviewed and discussed and ordered per Provider.    Health Maintenance Due   Topic Date Due    Flu vaccine (1) 2024    COVID-19 Vaccine (3 - 2024-25 season) 2024   .        \"Have you been to the ER, urgent care clinic since your last visit?  Hospitalized since your last visit?\"    NO    “Have you seen or consulted any other health care providers outside our system since your last visit?”            Click Here for Release of Records Request

## 2025-03-13 NOTE — PROGRESS NOTES
Jonel Ventura is a 44 y.o. male presents with   Chief Complaint   Patient presents with    Follow-up        Diagnosis   1. Chronic pain of left thumb    Patient complains of increasing pain at the base of the left thumb which he reports is increasing and intensity over the past several months denies injury.   2. Screening exam for skin cancer       3. Skin rash    Patient complains of red rash that develops to torso just beneath bilateral nipple lines as well as bilateral upper arm states rash gets significantly worse after a hot shower   4. Class 1 obesity without serious comorbidity with body mass index (BMI) of 31.0 to 31.9 in adult, unspecified obesity type    Patient BMI three 7.59 with a weight of 262 pounds.  He expresses frustration due to not being able to lose weight with dietary and lifestyle modifications         5. Gastroesophageal reflux disease without esophagitis        6.  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  He had EGD conducted January 22, 2025 with the impression of esophagitis and GE junction changes with duodenitis in bulb and minimal gastritis he was placed on omeprazole 40 mg every morning.  Today patient questions what further follow-up he will need.     /83 (BP Site: Right Upper Arm, Patient Position: Sitting, BP Cuff Size: Large Adult)   Pulse 87   Temp 97.8 °F (36.6 °C) (Temporal)   Resp 18   Ht 1.778 m (5' 10\")   Wt 118.8 kg (262 lb)   SpO2 98%   BMI 37.59 kg/m²   Subjective:     Past Medical History:   Diagnosis Date    Genital herpes simplex     HLD (hyperlipidemia)     Otitis externa     Vitamin D deficiency      Past Surgical History:   Procedure Laterality Date    APPENDECTOMY  09/05/2014     Social History     Socioeconomic History    Marital status:      Spouse name: None    Number of children: None    Years of education: None    Highest education level: None   Tobacco Use    Smoking status:

## 2025-03-14 ASSESSMENT — ENCOUNTER SYMPTOMS
WHEEZING: 0
CHEST TIGHTNESS: 0
SHORTNESS OF BREATH: 0

## 2025-04-09 ENCOUNTER — OFFICE VISIT (OUTPATIENT)
Age: 45
End: 2025-04-09

## 2025-04-09 VITALS — WEIGHT: 258 LBS | HEIGHT: 70 IN | BODY MASS INDEX: 36.94 KG/M2

## 2025-04-09 DIAGNOSIS — M18.12 ARTHRITIS OF CARPOMETACARPAL (CMC) JOINT OF LEFT THUMB: Primary | ICD-10-CM

## 2025-04-09 DIAGNOSIS — M72.0: ICD-10-CM

## 2025-04-09 RX ORDER — LIDOCAINE HYDROCHLORIDE 10 MG/ML
0.5 INJECTION, SOLUTION INFILTRATION; PERINEURAL ONCE
Status: COMPLETED | OUTPATIENT
Start: 2025-04-09 | End: 2025-04-09

## 2025-04-09 RX ADMIN — LIDOCAINE HYDROCHLORIDE 0.5 ML: 10 INJECTION, SOLUTION INFILTRATION; PERINEURAL at 10:09

## 2025-04-09 NOTE — PROGRESS NOTES
by me on the date of examination significant for no acute osseous abnormalities to include fracture or dislocation.  There are degenerative changes seen at the first CMC joint consistent with Eaton stage III.  There is radial subluxation of the metacarpal over the trapezium.      Impression:         ICD-10-CM    1. Arthritis of carpometacarpal (CMC) joint of left thumb  M18.12 AMB POC XRAY, WRIST; COMPLETE, 3+ VIEW     DRAIN/INJECT SMALL JOINT/BURSA     triamcinolone acetonide (KENALOG) injection 5 mg     lidocaine 1 % injection 0.5 mL      2. Dupuytren disease of palm  M72.0              Plan:     For quite a young man, Mr. Ventura does have significant CMC arthritis it is hard to know the true etiology.  We discussed treatment options.  At this point, I have recommended steroid injection into the CMC joint.  We did discuss that at some point, he will likely need surgical intervention we will hold off for as long as possible.    Left thumb CMC injection    CMC brace offered however patient declined    He does have an early Dupuytren's cord between his thumb and index finger on the left side.  We discussed genetic component, triggering factors, when intervention is indicated.    Plan was reviewed with patient, who verbalized agreement and understanding of the plan.     Return if symptoms worsen or fail to improve.     Leisa Ramirez PA-C  4/9/2025 10:11 AM    Note: This note was completed using voice recognition software.  Any typographical/name errors or mistakes are unintentional.

## 2025-04-29 ENCOUNTER — PATIENT MESSAGE (OUTPATIENT)
Dept: FAMILY MEDICINE CLINIC | Facility: CLINIC | Age: 45
End: 2025-04-29

## 2025-04-29 RX ORDER — OMEPRAZOLE 40 MG/1
40 CAPSULE, DELAYED RELEASE ORAL DAILY
Qty: 30 CAPSULE | Refills: 0 | Status: SHIPPED | OUTPATIENT
Start: 2025-04-29

## 2025-05-25 ENCOUNTER — PATIENT MESSAGE (OUTPATIENT)
Dept: FAMILY MEDICINE CLINIC | Facility: CLINIC | Age: 45
End: 2025-05-25

## 2025-06-24 ENCOUNTER — PATIENT MESSAGE (OUTPATIENT)
Dept: FAMILY MEDICINE CLINIC | Facility: CLINIC | Age: 45
End: 2025-06-24

## 2025-08-05 ENCOUNTER — OFFICE VISIT (OUTPATIENT)
Dept: FAMILY MEDICINE CLINIC | Facility: CLINIC | Age: 45
End: 2025-08-05
Payer: COMMERCIAL

## 2025-08-05 VITALS
WEIGHT: 246.7 LBS | TEMPERATURE: 97.4 F | DIASTOLIC BLOOD PRESSURE: 74 MMHG | HEIGHT: 70 IN | SYSTOLIC BLOOD PRESSURE: 115 MMHG | BODY MASS INDEX: 35.32 KG/M2 | HEART RATE: 82 BPM | OXYGEN SATURATION: 95 % | RESPIRATION RATE: 19 BRPM

## 2025-08-05 DIAGNOSIS — E66.01 CLASS 2 SEVERE OBESITY DUE TO EXCESS CALORIES WITH SERIOUS COMORBIDITY AND BODY MASS INDEX (BMI) OF 35.0 TO 35.9 IN ADULT (HCC): ICD-10-CM

## 2025-08-05 DIAGNOSIS — E66.812 CLASS 2 SEVERE OBESITY DUE TO EXCESS CALORIES WITH SERIOUS COMORBIDITY AND BODY MASS INDEX (BMI) OF 35.0 TO 35.9 IN ADULT (HCC): ICD-10-CM

## 2025-08-05 DIAGNOSIS — K21.9 GASTROESOPHAGEAL REFLUX DISEASE WITHOUT ESOPHAGITIS: Primary | ICD-10-CM

## 2025-08-05 PROCEDURE — 99213 OFFICE O/P EST LOW 20 MIN: CPT | Performed by: NURSE PRACTITIONER

## 2025-08-05 ASSESSMENT — PATIENT HEALTH QUESTIONNAIRE - PHQ9
SUM OF ALL RESPONSES TO PHQ QUESTIONS 1-9: 0
1. LITTLE INTEREST OR PLEASURE IN DOING THINGS: NOT AT ALL
2. FEELING DOWN, DEPRESSED OR HOPELESS: NOT AT ALL
SUM OF ALL RESPONSES TO PHQ QUESTIONS 1-9: 0

## 2025-08-05 ASSESSMENT — ENCOUNTER SYMPTOMS
NAUSEA: 0
VOMITING: 0

## 2025-08-25 RX ORDER — ERGOCALCIFEROL 1.25 MG/1
CAPSULE ORAL
Qty: 12 CAPSULE | Refills: 3 | Status: SHIPPED | OUTPATIENT
Start: 2025-08-25